# Patient Record
Sex: MALE | Race: AMERICAN INDIAN OR ALASKA NATIVE
[De-identification: names, ages, dates, MRNs, and addresses within clinical notes are randomized per-mention and may not be internally consistent; named-entity substitution may affect disease eponyms.]

---

## 2021-09-27 ENCOUNTER — HOSPITAL ENCOUNTER (INPATIENT)
Dept: HOSPITAL 5 - ED | Age: 56
LOS: 8 days | Discharge: HOME | DRG: 853 | End: 2021-10-05
Attending: INTERNAL MEDICINE | Admitting: INTERNAL MEDICINE
Payer: COMMERCIAL

## 2021-09-27 DIAGNOSIS — M86.171: ICD-10-CM

## 2021-09-27 DIAGNOSIS — Z79.899: ICD-10-CM

## 2021-09-27 DIAGNOSIS — A41.9: Primary | ICD-10-CM

## 2021-09-27 DIAGNOSIS — Z83.3: ICD-10-CM

## 2021-09-27 DIAGNOSIS — Z82.49: ICD-10-CM

## 2021-09-27 DIAGNOSIS — E11.52: ICD-10-CM

## 2021-09-27 DIAGNOSIS — N17.0: ICD-10-CM

## 2021-09-27 DIAGNOSIS — A48.0: ICD-10-CM

## 2021-09-27 DIAGNOSIS — L03.115: ICD-10-CM

## 2021-09-27 DIAGNOSIS — E11.69: ICD-10-CM

## 2021-09-27 DIAGNOSIS — E11.65: ICD-10-CM

## 2021-09-27 DIAGNOSIS — I10: ICD-10-CM

## 2021-09-27 LAB
ALBUMIN SERPL-MCNC: 4 G/DL (ref 3.9–5)
ALT SERPL-CCNC: 11 UNITS/L (ref 7–56)
BASOPHILS # (AUTO): 0.2 K/MM3 (ref 0–0.1)
BASOPHILS NFR BLD AUTO: 1.1 % (ref 0–1.8)
BILIRUB DIRECT SERPL-MCNC: < 0.2 MG/DL (ref 0–0.2)
EOSINOPHIL # BLD AUTO: 0 K/MM3 (ref 0–0.4)
EOSINOPHIL NFR BLD AUTO: 0.2 % (ref 0–4.3)
HCT VFR BLD CALC: 34.3 % (ref 35.5–45.6)
HGB BLD-MCNC: 10.9 GM/DL (ref 11.8–15.2)
LYMPHOCYTES # BLD AUTO: 2 K/MM3 (ref 1.2–5.4)
LYMPHOCYTES NFR BLD AUTO: 12.9 % (ref 13.4–35)
MCHC RBC AUTO-ENTMCNC: 32 % (ref 32–34)
MCV RBC AUTO: 78 FL (ref 84–94)
MONOCYTES # (AUTO): 1.2 K/MM3 (ref 0–0.8)
MONOCYTES % (AUTO): 7.8 % (ref 0–7.3)
PLATELET # BLD: 492 K/MM3 (ref 140–440)
RBC # BLD AUTO: 4.4 M/MM3 (ref 3.65–5.03)

## 2021-09-27 PROCEDURE — C2623 CATH, TRANSLUMIN, DRUG-COAT: HCPCS

## 2021-09-27 PROCEDURE — C1760 CLOSURE DEV, VASC: HCPCS

## 2021-09-27 PROCEDURE — 88311 DECALCIFY TISSUE: CPT

## 2021-09-27 PROCEDURE — C1714 CATH, TRANS ATHERECTOMY, DIR: HCPCS

## 2021-09-27 PROCEDURE — 80076 HEPATIC FUNCTION PANEL: CPT

## 2021-09-27 PROCEDURE — 37228: CPT

## 2021-09-27 PROCEDURE — 37225: CPT

## 2021-09-27 PROCEDURE — 75710 ARTERY X-RAYS ARM/LEG: CPT

## 2021-09-27 PROCEDURE — C1769 GUIDE WIRE: HCPCS

## 2021-09-27 PROCEDURE — 82962 GLUCOSE BLOOD TEST: CPT

## 2021-09-27 PROCEDURE — 76937 US GUIDE VASCULAR ACCESS: CPT

## 2021-09-27 PROCEDURE — C1887 CATHETER, GUIDING: HCPCS

## 2021-09-27 PROCEDURE — 75625 CONTRAST EXAM ABDOMINL AORTA: CPT

## 2021-09-27 PROCEDURE — 36415 COLL VENOUS BLD VENIPUNCTURE: CPT

## 2021-09-27 PROCEDURE — 80048 BASIC METABOLIC PNL TOTAL CA: CPT

## 2021-09-27 PROCEDURE — C1884 EMBOLIZATION PROTECT SYST: HCPCS

## 2021-09-27 PROCEDURE — 85027 COMPLETE CBC AUTOMATED: CPT

## 2021-09-27 PROCEDURE — 88305 TISSUE EXAM BY PATHOLOGIST: CPT

## 2021-09-27 PROCEDURE — 93925 LOWER EXTREMITY STUDY: CPT

## 2021-09-27 PROCEDURE — 83036 HEMOGLOBIN GLYCOSYLATED A1C: CPT

## 2021-09-27 PROCEDURE — 85025 COMPLETE CBC W/AUTO DIFF WBC: CPT

## 2021-09-27 PROCEDURE — 87040 BLOOD CULTURE FOR BACTERIA: CPT

## 2021-09-27 PROCEDURE — 82140 ASSAY OF AMMONIA: CPT

## 2021-09-27 PROCEDURE — 80202 ASSAY OF VANCOMYCIN: CPT

## 2021-09-27 NOTE — EVENT NOTE
Date of service: 09/27/21


Face to Face: 


For this encounter I have reviewed the PA/NP documentation, treatment plan, 

medical decision making, and I had face to face time with this patient. 





 patient presented secondary to right foot and toe pain.  He had a blister to 

the lateral aspect of the right foot several weeks ago.  It had popped.  He 

started to have problems.  He went to a podiatrist several weeks ago and was 

told that it was not infected.  He is supposed to go the by just tomorrow for 

recheck.  His little toe started to become more dark and dusky.  His wife was 

concerned.  She referred to me for evaluation.  There is no known trauma.





On exam, patient has a dusky right little toe.  This is consistent with a 

gangrene presentation.  There is a large ulcer to the lateral aspect of the 

right foot distally.  There is edema and erythema and warmth to the dorsum of 

the right foot.  This is consistent with a cellulitis.  Patient will require 

admission for IV antibiotics and orthopedic consultation.  He is aware that he 

may lose the toe.  He is in fact diabetic.

## 2021-09-27 NOTE — XRAY REPORT
RIGHT FOOT 3 VIEWS 2255



INDICATION: foot cellulitis with open wound



COMPARISON: 3/6/2015



FINDINGS: Old second metatarsal fracture is again noted. No acute fractures or dislocations are seen.
 Tarsal and ankle degenerative changes are noted.



Wound along the ventral lateral aspect of the distal foot is seen in the area of the fifth metatarsop
halangeal joint. No foreign bodies are noted. Gas is seen in the soft tissues in this area. The base 
of the proximal phalanx of the fifth digit as well as the head of the metatarsal show areas of decrea
sed density and decreased cortical definition of concern for developing osteomyelitis.



MR is suggested.







Signer Name: Ariel Monsalve MD 

Signed: 9/27/2021 11:44 PM

Workstation Name: [id number]

## 2021-09-27 NOTE — EMERGENCY DEPARTMENT REPORT
ED Lower Extremity HPI





- General


Chief Complaint: Extremity Injury, Lower


Stated Complaint: DIABETIC FOOT SORE


Time Seen by Provider: 21 22:39


Source: patient


Mode of arrival: Ambulatory


Limitations: No Limitations





- History of Present Illness


Initial Comments: 





This is a 56-year-old male nontoxic, well nourished in appearance, no acute 

signs of distress presents to the ED with c/o of right lateral foot open wound 

and cyanotic right fifth toe times several days.  Patient stated had a small 

blister which turned into an open wound and was treated with a podiatrist but 

symptoms worsen.  Patient was on outpatient antibiotics and was brought by PCP 

for further evaluation and treatment.  Denies any trauma or injuries.  Patient 

denies any numbness, tingling, fever, chills, nausea, vomiting, chest pain, 

shortness of breath, headache, stiff neck.  Patient stated has decreased gait 

due to pain.  Patient denies any allergies. PMH include DM.


MD Complaint: foot injury


-: days(s)


Injury: Foot: Right, Toes: Right


Severity: mild


Severity scale (0 -10): 8


Improves With: immobilization


Worsens With: weight bearing, movement, palpation


Associated Symptoms: swelling, able to partially bear weight.  denies: snap/pop 

sensation, numbness, tingling, unable to bear weight





- Related Data


                                Home Medications











 Medication  Instructions  Recorded  Confirmed  Last Taken


 


Amlodipine Besylate [Norvasc] 10 mg PO DAILY 03/06/15 03/11/15 03/05/15


 


Gabapentin 100 mg PO DAILY 03/06/15 03/11/15 03/05/15


 


Losartan/Hydrochlorothiazide 100 - 125 each PO DAILY 03/06/15 03/11/15 03/05/15





[Hyzaar 100-25 TAB]    


 


Metformin HCl [Fortamet ER] 1,000 mg PO QDAY 03/06/15 03/11/15 03/05/15


 


Simvastatin 20 mg PO QHS 03/06/15 03/11/15 03/05/15


 


glipiZIDE [Glucotrol] 10 mg PO QDAY 03/06/15 03/11/15 03/05/15








                                  Previous Rx's











 Medication  Instructions  Recorded  Last Taken  Type


 


Amoxicillin/K Clav Tab [Augmentin 1 each PO Q12H #10 day 03/09/15 Unknown Rx





875MG TAB]    











                                    Allergies











Allergy/AdvReac Type Severity Reaction Status Date / Time


 


No Known Allergies Allergy   Verified 21 22:32














ED Review of Systems


ROS: 


Stated complaint: DIABETIC FOOT SORE


Other details as noted in HPI





Comment: All other systems reviewed and negative


Constitutional: denies: chills, fever


Eyes: denies: eye pain, eye discharge, vision change


ENT: denies: ear pain, throat pain


Respiratory: denies: cough, shortness of breath, wheezing


Cardiovascular: denies: chest pain, palpitations


Endocrine: no symptoms reported


Gastrointestinal: denies: abdominal pain, nausea, diarrhea


Genitourinary: denies: urgency, dysuria


Musculoskeletal: denies: back pain, joint swelling, arthralgia


Skin: denies: rash, lesions


Neurological: denies: headache, weakness, paresthesias


Psychiatric: denies: anxiety, depression


Hematological/Lymphatic: denies: easy bleeding, easy bruising





ED Past Medical Hx





- Past Medical History


Hx Hypertension: Yes


Hx Diabetes: Yes (21 years ago)


Hx COPD: No





- Social History


Smoking Status: Never Smoker





- Medications


Home Medications: 


                                Home Medications











 Medication  Instructions  Recorded  Confirmed  Last Taken  Type


 


Amlodipine Besylate [Norvasc] 10 mg PO DAILY 03/06/15 03/11/15 03/05/15 History


 


Gabapentin 100 mg PO DAILY 03/06/15 03/11/15 03/05/15 History


 


Losartan/Hydrochlorothiazide 100 - 125 each PO DAILY 03/06/15 03/11/15 03/05/15 

History





[Hyzaar 100-25 TAB]     


 


Metformin HCl [Fortamet ER] 1,000 mg PO QDAY 03/06/15 03/11/15 03/05/15 History


 


Simvastatin 20 mg PO QHS 03/06/15 03/11/15 03/05/15 History


 


glipiZIDE [Glucotrol] 10 mg PO QDAY 03/06/15 03/11/15 03/05/15 History


 


Amoxicillin/K Clav Tab [Augmentin 1 each PO Q12H #10 day 03/09/15 03/11/15 

Unknown Rx





875MG TAB]     














ED Physical Exam





- General


Limitations: No Limitations


General appearance: alert, in no apparent distress





- Head


Head exam: Present: atraumatic, normocephalic





- Eye


Eye exam: Present: normal appearance





- Neck


Neck exam: Present: normal inspection.  Absent: lymphadenopathy





- Respiratory


Respiratory exam: Absent: respiratory distress





- Cardiovascular


Cardiovascular Exam: Present: tachycardia





- Extremities Exam


Extremities exam: Present: full ROM, tenderness, normal capillary refill, pedal 

edema.  Absent: joint swelling, calf tenderness





- Expanded Lower Extremity Exam


  ** Right


Hip exam: Present: normal inspection, full ROM.  Absent: tenderness, swelling


Upper Leg exam: Present: normal inspection, full ROM.  Absent: tenderness, 

swelling


Knee exam: Present: normal inspection, full ROM.  Absent: tenderness, swelling


Lower Leg exam: Present: normal inspection, full ROM.  Absent: tenderness, 

swelling


Ankle exam: Present: normal inspection, full ROM.  Absent: tenderness, swelling,

abrasion, laceration, ecchymosis, deformity, crepidus, dislocation, erythema, 

anterior draw sign


Foot/Toe exam: Present: normal inspection, full ROM, tenderness, swelling, 

ecchymosis, erythema.  Absent: abrasion, laceration, deformity, crepidus, 

dislocation, amputation, foreign body, calcaneal tenderness, tenderness at base 

of 5th metatarsal, nail avulsion, subungual hematoma


Gait: Positive: observed and limited by pain





                            __________________________














                            __________________________





 1 - Exam consistent with necrosis








                            __________________________














                            __________________________





 1 - Cellulitis with some swelling








- Back Exam


Back exam: Present: normal inspection, full ROM





- Neurological Exam


Neurological exam: Present: alert, oriented X3





- Psychiatric


Psychiatric exam: Present: normal affect, normal mood





- Skin


Skin exam: Present: warm, dry, intact, normal color.  Absent: rash





ED Course


                                   Vital Signs











  21





  22:37


 


Temperature 99.1 F


 


Pulse Rate 108 H


 


Respiratory 18





Rate 


 


Blood Pressure 151/88





[Left] 


 


O2 Sat by Pulse 97





Oximetry 














- Reevaluation(s)


Reevaluation #1: 





21 23:39


Patient is speaking in full sentences with no signs of distress noted.





- Consultations


Consultation #1: 





21 23:39


Patient has been consulted with Dr. Pergrem about patient history, physical 

exam, and examined patient and agrees to ED plan of care and admission.


Consultation #2: 





21 00:17


Patient has been consulted with REINA Isidro (hospitalist) about patient 

history, physical exam, and labs/imaging results and accepts patient to 

services.











ED Lower Extremity MDM





- Lab Data


Result diagrams: 


                                 21 23:17





                                 21 23:17





- Radiology Data





[facility] [address] [place] [address] 

XRay Report Signed Patient: MANNIE MCCARTNEY MR#: [id number] : 1965 

Acct:[id number] Age/Sex: 56 / M ADM Date: 21 Loc: ED Attending Dr: 

Ordering Physician: JOSE ELIAS SUAZO NP Date of Service: 21 Procedure(s): XR

 foot 3+V RT Accession Number(s): [id number] cc: JOSE ELIAS SUAZO NP Fluoro Time In 

Minutes: RIGHT FOOT 3 VIEWS 2255 INDICATION: foot cellulitis with open wound 

COMPARISON: 3/6/2015 FINDINGS: Old second metatarsal fracture is again noted. No

 acute fractures or dislocations are seen. Tarsal and ankle degenerative changes

 are noted. Wound along the ventral lateral aspect of the distal foot is seen in

 the area of the fifth metatarsophalangeal joint. No foreign bodies are noted. 

Gas is seen in the soft tissues in this area. The base of the proximal phalanx 

of the fifth digit as well as the head of the metatarsal show areas of decreased

 density and decreased cortical definition of concern for developing 

osteomyelitis. MR is suggested. Signer Name: Ariel Monsalve MD Signed: 

2021 11:44 PM Workstation Name: [id number] Transcribed By: GJ Dictated 

By: Ariel Monsalve MD Electronically Authenticated By: Ariel Monsalve MD Signed Date/Time: 21 DD/DT: 21 TD/TT:





- Medical Decision Making





56-year-old male that presents with necrosis and most likely osteomyelitis.  

Patient is stable and was examined by me.  Patient admitted with hospitalist.  

Patient is notified of the lab results and x-ray imaging with no questions noted

 by the patient.  Patient received Zosyn IV in ER.  Vital signs are stable on 

admission.  At time of admission, the patient does not seem toxic or ill in ap

pearance.  No acute signs of distress noted.  Patient agrees to admission 

treatment plan of care.  No further questions noted by the patient.


Critical care attestation.: 


If time is entered above; I have spent that time in minutes in the direct care 

of this critically ill patient, excluding procedure time.








ED Disposition


Clinical Impression: 


 Wound infection, Toe necrosis, Cellulitis of foot, right





Foot osteomyelitis, right


Qualifiers:


 Osteomyelitis type: unspecified type Qualified Code(s): M86.9 - Osteomyelitis, 

unspecified





Wound, open, foot


Qualifiers:


 Encounter type: initial encounter Laterality: right Qualified Code(s): S91.301A

 - Unspecified open wound, right foot, initial encounter





Disposition:  ADMITTED AS INPATIENT


Is pt being admited?: Yes


Condition: Stable


Time of Disposition: 00:17

## 2021-09-28 LAB
BUN SERPL-MCNC: 40 MG/DL (ref 9–20)
BUN/CREAT SERPL: 22 %
CALCIUM SERPL-MCNC: 10 MG/DL (ref 8.4–10.2)
HEMOLYSIS INDEX: 0

## 2021-09-28 RX ADMIN — INSULIN LISPRO SCH UNIT: 100 INJECTION, SOLUTION INTRAVENOUS; SUBCUTANEOUS at 21:58

## 2021-09-28 RX ADMIN — FAMOTIDINE SCH MG: 10 INJECTION, SOLUTION INTRAVENOUS at 21:59

## 2021-09-28 RX ADMIN — DOCUSATE SODIUM SCH MG: 100 CAPSULE, LIQUID FILLED ORAL at 14:16

## 2021-09-28 RX ADMIN — Medication SCH ML: at 14:17

## 2021-09-28 RX ADMIN — HEPARIN SODIUM SCH UNIT: 5000 INJECTION, SOLUTION INTRAVENOUS; SUBCUTANEOUS at 21:58

## 2021-09-28 RX ADMIN — INSULIN GLARGINE SCH: 100 INJECTION, SOLUTION SUBCUTANEOUS at 13:00

## 2021-09-28 RX ADMIN — FAMOTIDINE SCH MG: 10 INJECTION, SOLUTION INTRAVENOUS at 14:17

## 2021-09-28 RX ADMIN — METRONIDAZOLE SCH MLS/HR: 5 INJECTION, SOLUTION INTRAVENOUS at 17:04

## 2021-09-28 RX ADMIN — GABAPENTIN SCH MG: 100 CAPSULE ORAL at 14:16

## 2021-09-28 RX ADMIN — Medication SCH ML: at 22:00

## 2021-09-28 RX ADMIN — INSULIN LISPRO SCH: 100 INJECTION, SOLUTION INTRAVENOUS; SUBCUTANEOUS at 12:58

## 2021-09-28 RX ADMIN — HEPARIN SODIUM SCH UNIT: 5000 INJECTION, SOLUTION INTRAVENOUS; SUBCUTANEOUS at 14:16

## 2021-09-28 RX ADMIN — DOCUSATE SODIUM SCH MG: 100 CAPSULE, LIQUID FILLED ORAL at 21:59

## 2021-09-28 RX ADMIN — METRONIDAZOLE SCH MLS/HR: 5 INJECTION, SOLUTION INTRAVENOUS at 21:58

## 2021-09-28 RX ADMIN — CEFTRIAXONE SODIUM SCH MLS/HR: 2 INJECTION, POWDER, FOR SOLUTION INTRAMUSCULAR; INTRAVENOUS at 15:22

## 2021-09-28 RX ADMIN — PRAVASTATIN SODIUM SCH MG: 40 TABLET ORAL at 21:59

## 2021-09-28 NOTE — HISTORY AND PHYSICAL REPORT
History of Present Illness


Date of examination: 09/28/21


Date of admission: 


09/28/21 00:25





Chief complaint: 





Right foot wound, right foot cellulitis


History of present illness: 





56-year-old -American male with history of uncontrolled diabetes, 

hypertension, and nonhealing diabetic foot ulcer who presents to [facility] ED with 

complaints of right foot wound.  Patient reports having a small blister on his 

right fifth toe that has worsened and developed into an open wound over the past

few weeks.  He was following with outpatient podiatry and was started on oral a

ntibiotics for treatment of wound.  However, the wound has not improved but 

rather worsened despite receiving treatment.  Patient states he called his 

podiatrist to advise of worsening wound, which now had increased red tinge 

drainage, and was told to report to the ED for further patient and treatment. 





Endorses being fully vaccinated for COVID-19.  He received the Pfizer vaccine in

April of this year.





Denies fever, chills, nausea, vomiting diarrhea, headache, shortness of breath, 

chest pain, palpitation, foul/malodorous drainage from wound, increased 

urination, polydipsia, polyphagia, noncompliance with meds, recent injury/trauma

to right foot, or recent sick contacts








Past History


Past Medical History: diabetes, hypertension


Past Surgical History: Other (Right knee surgery)


Social history: , lives with family, full code.  denies: smoking, alcohol

abuse, prescription drug abuse, IV drug use


Family history: CAD, diabetes, hypertension





Medications and Allergies


                                    Allergies











Allergy/AdvReac Type Severity Reaction Status Date / Time


 


No Known Allergies Allergy   Verified 09/27/21 22:32











                                Home Medications











 Medication  Instructions  Recorded  Confirmed  Last Taken  Type


 


Amlodipine Besylate [Norvasc] 10 mg PO DAILY 03/06/15 03/11/15 03/05/15 History


 


Gabapentin 100 mg PO DAILY 03/06/15 03/11/15 03/05/15 History


 


Losartan/Hydrochlorothiazide 100 - 125 each PO DAILY 03/06/15 03/11/15 03/05/15 

History





[Hyzaar 100-25 TAB]     


 


Metformin HCl [Fortamet ER] 1,000 mg PO QDAY 03/06/15 03/11/15 03/05/15 History


 


Simvastatin 20 mg PO QHS 03/06/15 03/11/15 03/05/15 History


 


glipiZIDE [Glucotrol] 10 mg PO QDAY 03/06/15 03/11/15 03/05/15 History


 


Amoxicillin/K Clav Tab [Augmentin 1 each PO Q12H #10 day 03/09/15 03/11/15 

Unknown Rx





875MG TAB]     











Active Meds: 


Active Medications





Acetaminophen (Acetaminophen 325 Mg Tab)  650 mg PO Q4H PRN


   PRN Reason: Pain MILD(1-3)/Fever >100.5/HA


Amlodipine Besylate (Amlodipine 10 Mg Tab)  10 mg PO DAILY [facility]


Dextrose (Dextrose 50% In Water (25gm) 50 Ml Syringe)  50 ml IV Q30MIN PRN; 

Protocol


   PRN Reason: Hypoglycemia


Docusate Sodium (Docusate Sodium 100 Mg Cap)  100 mg PO BID PAULY


Famotidine (Famotidine 20 Mg/2 Ml Inj)  10 mg IV BID PAULY


Gabapentin (Gabapentin 100 Mg Cap)  100 mg PO DAILY [facility]


Heparin Sodium (Porcine) (Heparin 5,000 Unit/1 Ml Vial)  5,000 unit SUB-Q Q12HR 

[facility]


Sodium Chloride (Nacl 0.45% 1000 Ml)  1,000 mls @ 75 mls/hr IV AS DIRECT PAULY


   Stop: 09/28/21 12:00


Piperacillin Sod/Tazobactam Sod (Zosyn/Ns 4.5gm/100ml)  4.5 gm in 100 mls @ 200 

mls/hr IV Q8H PAULY; Protocol


Insulin Human Lispro (Insulin Lispro 100 Unit/Ml)  0 unit SUB-Q ACHS PAULY; 

Protocol


Morphine Sulfate (Morphine 4 Mg/1 Ml Inj)  4 mg IV Q4H PRN


   PRN Reason: Pain , Severe (7-10)


Naloxone HCl (Naloxone 0.4 Mg/1 Ml Inj)  0.1 mg IV Q2MIN PRN


   PRN Reason: Res Rate </= 8 or 02 SAT < 92%


Ondansetron HCl (Ondansetron 4 Mg/2 Ml Inj)  4 mg IV Q6H PRN


   PRN Reason: Nausea And Vomiting


Oxycodone/Acetaminophen (Oxycodone /Acetaminophen 5-325mg Tab)  1 tab PO Q6H PRN


   PRN Reason: Pain, Moderate (4-6)


Pravastatin Sodium (Pravastatin 40 Mg Tab)  40 mg PO QHS [facility]


Sodium Chloride (Sodium Chloride 0.9% 10 Ml Flush Syringe)  10 ml IV BID PAULY


Sodium Chloride (Sodium Chloride 0.9% 10 Ml Flush Syringe)  10 ml IV PRN PRN


   PRN Reason: LINE FLUSH











Review of Systems


All systems: negative (as noted in HPI)





Exam





- Physical Exam


Narrative exam: 





Physical exam





General appearance: Present: No acute distress, alert and oriented 3,   

American adult man





- EENT


Eyes: Present: PERRL, EOM intact


ENT: hearing intact, normal dentition





- Neck


Neck: Present: supple, normal ROM





- Respiratory


Respiratory effort: Non-labored


Respiratory: Clear throughout





- Cardiovascular


Heart rate: 88 (bpm)


Rhythm: Sinus 


Heart Sounds: Present: S1 & S2.  Absent: rub, click





- Extremities


Extremities: pulses intact, right ankle edema with slight erythema, right fourth

and fifth toe wound with moderate blood tinge drainage, and yellowish wound bed,

right fourth and fifth toe with dark discoloration (likely due to poor 

perfusion)





- Peripheral Assessment


Peripheral Pulses: within normal limits


 


- Abdominal


General gastrointestinal: soft, non-tender, normal bowel sounds





- Integumentary


Integumentary: Present: warm, dry





- Musculoskeletal


Musculoskeletal: Able to move all extremities, limited ROM to right foot due to 

wound





-Neurological


Neurological: CN II-XII intact





- Psychiatric


Psychiatric: cooperative





- Constitutional


Vitals: 


                                        











Temp Pulse Resp BP Pulse Ox


 


 99.1 F   108 H  18   151/88   97 


 


 09/27/21 22:37  09/27/21 22:37  09/27/21 22:37  09/27/21 22:37  09/27/21 22:37














Results





- Labs


CBC & Chem 7: 


                                 09/27/21 23:17





                                 09/27/21 23:17


Labs: 


                             Laboratory Last Values











WBC  15.4 K/mm3 (4.5-11.0)  H  09/27/21  23:17    


 


RBC  4.40 M/mm3 (3.65-5.03)   09/27/21  23:17    


 


Hgb  10.9 gm/dl (11.8-15.2)  L  09/27/21  23:17    


 


Hct  34.3 % (35.5-45.6)  L  09/27/21  23:17    


 


MCV  78 fl (84-94)  L  09/27/21  23:17    


 


MCH  25 pg (28-32)  L  09/27/21  23:17    


 


MCHC  32 % (32-34)   09/27/21  23:17    


 


RDW  14.3 % (13.2-15.2)   09/27/21  23:17    


 


Plt Count  492 K/mm3 (140-440)  H  09/27/21  23:17    


 


Lymph % (Auto)  12.9 % (13.4-35.0)  L  09/27/21  23:17    


 


Mono % (Auto)  7.8 % (0.0-7.3)  H  09/27/21  23:17    


 


Eos % (Auto)  0.2 % (0.0-4.3)   09/27/21  23:17    


 


Baso % (Auto)  1.1 % (0.0-1.8)   09/27/21  23:17    


 


Lymph # (Auto)  2.0 K/mm3 (1.2-5.4)   09/27/21  23:17    


 


Mono # (Auto)  1.2 K/mm3 (0.0-0.8)  H  09/27/21  23:17    


 


Eos # (Auto)  0.0 K/mm3 (0.0-0.4)   09/27/21  23:17    


 


Baso # (Auto)  0.2 K/mm3 (0.0-0.1)  H  09/27/21  23:17    


 


Seg Neutrophils %  78.0 % (40.0-70.0)  H  09/27/21  23:17    


 


Seg Neutrophils #  12.0 K/mm3 (1.8-7.7)  H  09/27/21  23:17    


 


Sodium  134 mmol/L (137-145)  L  09/27/21  23:17    


 


Potassium  4.2 mmol/L (3.6-5.0)   09/27/21  23:17    


 


Chloride  93.1 mmol/L ()  L  09/27/21  23:17    


 


Carbon Dioxide  29 mmol/L (22-30)   09/27/21  23:17    


 


Anion Gap  16 mmol/L  09/27/21  23:17    


 


BUN  40 mg/dL (9-20)  H  09/27/21  23:17    


 


Creatinine  1.8 mg/dL (0.8-1.3)  H  09/27/21  23:17    


 


Estimated GFR  47 ml/min  09/27/21  23:17    


 


BUN/Creatinine Ratio  22 %  09/27/21  23:17    


 


Glucose  178 mg/dL ()  H  09/27/21  23:17    


 


Hemoglobin A1c  11.8 % (4-6)  H  09/27/21  23:17    


 


Lactic Acid  1.80 mmol/L (0.7-2.0)   09/27/21  23:17    


 


Calcium  10.0 mg/dL (8.4-10.2)   09/27/21  23:17    


 


Total Bilirubin  0.40 mg/dL (0.1-1.2)   09/27/21  23:17    


 


Direct Bilirubin  < 0.2 mg/dL (0-0.2)   09/27/21  23:17    


 


Indirect Bilirubin  0.2 mg/dL  09/27/21  23:17    


 


AST  11 units/L (5-40)   09/27/21  23:17    


 


ALT  11 units/L (7-56)   09/27/21  23:17    


 


Alkaline Phosphatase  107 units/L ()   09/27/21  23:17    


 


Total Protein  8.6 g/dL (6.3-8.2)  H  09/27/21  23:17    


 


Albumin  4.0 g/dL (3.9-5)   09/27/21  23:17    


 


Albumin/Globulin Ratio  0.9 %  09/27/21  23:17    











Microbiology: 


Microbiology





09/27/21 23:17   Peripheral/Venous   Blood Culture - Preliminary


                            Culture in Progress


09/27/21 23:13   Peripheral/Venous   Blood Culture - Preliminary


                            Culture in Progress











- Imaging and Cardiology


Imaging and Cardiology: 





Foot XR:





FINDINGS: Old second metatarsal fracture is again noted. No acute fractures or 

dislocations are seen. Tarsal and ankle degenerative changes are noted. Wound 

along the ventral lateral aspect of the distal foot is seen in the area of the 

fifth metatarsophalangeal joint. No foreign bodies are noted. Gas is seen in the

soft tissues in this area. The base of the proximal phalanx of the fifth digit 

as well as the head of the metatarsal show areas of decreased density and decre

ased cortical definition of concern for developing osteomyelitis. 





Assessment and Plan


Assessment and plan: 














Right foot cellulitis


-On IV ABX


-Cultures pending


-Supportive care








Right foot gas gangrene


-Foot x-ray reveals Gas is seen in the soft tissues of the fifth metacarpal 

joint


-Cultures pending


-On IV ABX


--ID and Ortho consult pending


-Wound care education pending


-Wound care consult pending





Suspicion for osteomyelitis


-Foot x-ray shows suspicion for osteomyelitis


-MRI pending


-Cultures pending


-On IV ABX


-ID and Ortho consult pending


-May resume outpatient follow-up with podiatry as needed once discharge





GERARD


-Cr on admission 1.8


-Hydrate with IVF


-Avoid nephrotoxic agents


-Renal dose all meds


-Monitor renal function





DM2


-Uncontrolled


-HgbA1c 11.8


-POC BG monitoring


-Schedule Lantus and SSI coverage prn


-May benefit from diabetic education





HTN


-Monitor BP


-Resume home hypertensive meds, to optimize BP








DVT and GI PPX


-On heparin and Pepcid





Advance Directives: No


VTE prophylaxis?: Chemical, Mechanical


Plan of care discussed with patient/family: Yes

## 2021-09-28 NOTE — VASCULAR LAB REPORT
DUPLEX DOPPLER LOWER EXTREMITY ARTERIAL, BILATERAL



INDICATION / CLINICAL INFORMATION: peripheral vascular disease, wounds.



TECHNIQUE: Arterial duplex examination of both lower extremities performed using B-mode, color flow a
nd spectral Doppler assessment.



COMPARISON: None available.



FINDINGS: 



RIGHT:

Common Femoral Artery: PSV 88 cm/sec.  Triphasic waveform.

Proximal SFA: PSV 68 cm/sec.  Triphasic waveform.

Mid SFA:  cm/sec.  Monophasic waveform.

Distal SFA: PSV 65 cm/sec.  Monophasic waveform.

Popliteal artery:  cm/sec.  Monophasic waveform.

Posterior tibial artery:  cm/sec.  Monophasic waveform.

Dorsalis Pedis Artery: PSV 48 cm/sec.  Monophasic waveform.



LEFT:

Common Femoral Artery: PSV 90 cm/sec.  Triphasic waveform.

Proximal SFA: PSV 57 cm/sec.  Triphasic waveform.

Mid SFA:  cm/sec.  Triphasic waveform.

Distal SFA: PSV 82 cm/sec.  Biphasic waveform.

Popliteal artery: PSV 34 cm/sec.  Triphasic waveform.

Posterior tibial artery:  cm/sec.  Triphasic waveform.

Dorsalis Pedis Artery: PSV 12 cm/sec.  Monophasic waveform.



Right KRISTIN: Not calculated.

Left KRISTIN: Not calculated.



IMPRESSION:

1. Multifocal atherosclerotic disease is present within both lower extremities. Abnormal waveforms ar
e noted bilaterally, right greater than left.

2. There is significant stenosis of the right mid superficial femoral artery and right popliteal andres
ry. There is also stenosis noted within the left posterior tibial artery. 

3. There is suggestion occlusion of the right distal anterior tibial artery and peroneal artery.



Conventional or CT angiography should be considered to better characterize these findings.



======================

Ankle-Brachial Index (KRISTIN): 

======================

- Calcified arteries > 1.4 

- Normal = 0.9-1.4 

- Mild PAD = 0.7-0.89

- Moderate PAD = 0.51-0.69 

- Severe PAD < 0.5



======================

Doppler Waveform: 

======================

- Triphasic is normal. 

- Biphasic is abnormal if clear transition from triphasic signal along vascular tree.

- Monophasic is abnormal.



Scribed by: Vane Dixon RDMS, RVT 

Scribed: 9/28/2021 4:24 PM 



 I have reviewed the images, agree with this report, and edited this report as needed.



Signer Name: Janes Hinson MD 

Signed: 9/28/2021 5:30 PM

Workstation Name: [id number]

## 2021-09-28 NOTE — MAGNETIC RESONANCE REPORT
MRI RIGHT FOOT WITHOUT CONTRAST



INDICATION / CLINICAL INFORMATION: Foot swelling. Osteomyelitis.



TECHNIQUE: Multiplanar, multisequence MR images were obtained.



COMPARISON: Radiographs dated 9/27/2021



FINDINGS:



BONES: There is marrow edema in the fifth metatarsal with early resorption of the fifth metatarsal he
ad and neck. No acute fracture seen. There is also marrow edema in the proximal phalanx of the fifth 
toe. There is a chronic healed fracture of second metatarsal.

JOINTS: Moderate DJD of the great toe MTP and interphalangeal joint. No significant joint effusion or
 synovitis. 



MUSCLES: Nonspecific edema in the flexor muscles of the foot.



FLEXOR TENDONS: No significant abnormality.

EXTENSOR TENDONS: No significant abnormality.

PERONEAL TENDONS: No significant abnormality.

LIGAMENTS: No significant abnormality.



SOFT TISSUES: There is a large soft tissue wound at the lateral foot adjacent to fifth metatarsal phuong
suring proximally 4 cm in length. There is adjacent gas in the subcutaneous tissues.



ADDITIONAL FINDINGS: None.



IMPRESSION:

1. Osteomyelitis of fifth metatarsal and proximal phalanx of fifth toe. Adjacent large soft tissue wo
und with gas in the soft tissues. No soft tissue abscess.

2. Chronic healed fracture deformity of second metatarsal.

3. Degenerative changes as above.





==================================================================

Report dictated by: Franklyn Camara MD 

Report dictated on: 9/28/2021 9:14 AM



I have reviewed the images, agree with this report, and edited this report as needed.

 



Signer Name: JING Maciel MD 

Signed: 9/28/2021 12:29 PM

Workstation Name: [id number]

## 2021-09-28 NOTE — CONSULTATION
History of Present Illness





- Reason for Consult


Consult date: 09/28/21


osteomyelitis


Requesting physician: JERRY MANRIQUE





- History of Present Illness





The patient is a 56-year-old male with diabetes, hypertension, worsening 

diabetic foot ulceration and wound over the last 2 weeks came to the emergency 

room for additional evaluation.  He reports she was following up with outpatient

podiatry, was on oral antibiotics.  With increasing drainage he was advised to 

come to the ER.  Here, noted to have leukocytosis, low-grade fever.  Creatinine 

1.8.  MRI of the right foot was done which revealed osteomyelitis of the fifth 

metatarsal and proximal phalanx of the fifth toe along with an adjacent large 

soft tissue wound with soft tissue gas.  No abscess.





Review of Systems: 


General: no fevers,chills or rigors


HEENT: no new visual disturbance


Respiratory: No cough, sputum, hemoptysis or shortness of breath


Cardiovascular: No chest pain, syncope


Gastrointestinal: No nausea, vomiting or diarrhea


Genitourinary: No dysuria or hematuria


Musculoskeletal: No new or worsening neck pain or back pain 


Neurologic: No headaches, seizures


Hematologic: No easy bruising or bleeding


Endocrine: No night sweats or acute weight loss


Skin: negative for rash, jaundice


Psychiatric: No suicidal or homicidal ideation





Past History


Past Medical History: diabetes, hypertension


Past Surgical History: Other (Right knee surgery)


Social history: , lives with family, full code.  denies: smoking, alcohol

abuse, prescription drug abuse, IV drug use


Family history: CAD, diabetes, hypertension





Medications and Allergies


                                    Allergies











Allergy/AdvReac Type Severity Reaction Status Date / Time


 


No Known Allergies Allergy   Verified 09/27/21 22:32











                                Home Medications











 Medication  Instructions  Recorded  Confirmed  Last Taken  Type


 


Amlodipine Besylate [Norvasc] 10 mg PO DAILY 03/06/15 03/11/15 03/05/15 History


 


Gabapentin 100 mg PO DAILY 03/06/15 03/11/15 03/05/15 History


 


Losartan/Hydrochlorothiazide 100 - 125 each PO DAILY 03/06/15 03/11/15 03/05/15 

History





[Hyzaar 100-25 TAB]     


 


Metformin HCl [Fortamet ER] 1,000 mg PO QDAY 03/06/15 03/11/15 03/05/15 History


 


Simvastatin 20 mg PO QHS 03/06/15 03/11/15 03/05/15 History


 


glipiZIDE [Glucotrol] 10 mg PO QDAY 03/06/15 03/11/15 03/05/15 History


 


Amoxicillin/K Clav Tab [Augmentin 1 each PO Q12H #10 day 03/09/15 03/11/15 

Unknown Rx





875MG TAB]     











Active Meds: 


Active Medications





Acetaminophen (Acetaminophen 325 Mg Tab)  650 mg PO Q4H PRN


   PRN Reason: Pain MILD(1-3)/Fever >100.5/HA


Amlodipine Besylate (Amlodipine 10 Mg Tab)  10 mg PO DAILY [facility]


Dextrose (Dextrose 50% In Water (25gm) 50 Ml Syringe)  50 ml IV Q30MIN PRN; 

Protocol


   PRN Reason: Hypoglycemia


Docusate Sodium (Docusate Sodium 100 Mg Cap)  100 mg PO BID [facility]


Famotidine (Famotidine 20 Mg/2 Ml Inj)  10 mg IV BID [facility]


Gabapentin (Gabapentin 100 Mg Cap)  100 mg PO DAILY [facility]


Heparin Sodium (Porcine) (Heparin 5,000 Unit/1 Ml Vial)  5,000 unit SUB-Q Q12HR 

[facility]


Vancomycin HCl 1,500 mg/ (Sodium Chloride)  530 mls @ 333.333 mls/hr IV Q24H [facility]


Ceftriaxone Sodium (Rocephin/Ns 2 Gm/100 Ml)  2 gm in 100 mls @ 200 mls/hr IV 

Q24HR [facility]; Protocol


Metronidazole (Flagyl 500 Mg/100 Ml)  500 mg in 100 mls @ 100 mls/hr IV Q8H [facility];

Protocol


Insulin Glargine (Insulin Glargine 100 Units/Ml)  15 units SUB-Q QAMDIAB [facility]


   Last Admin: 09/28/21 13:00 Dose:  Not Given


   Documented by: 


Insulin Human Lispro (Insulin Lispro 100 Unit/Ml)  0 unit SUB-Q ACHS [facility]; 

Protocol


   Last Admin: 09/28/21 12:58 Dose:  Not Given


   Documented by: 


Morphine Sulfate (Morphine 4 Mg/1 Ml Inj)  4 mg IV Q4H PRN


   PRN Reason: Pain , Severe (7-10)


Naloxone HCl (Naloxone 0.4 Mg/1 Ml Inj)  0.1 mg IV Q2MIN PRN


   PRN Reason: Res Rate </= 8 or 02 SAT < 92%


Ondansetron HCl (Ondansetron 4 Mg/2 Ml Inj)  4 mg IV Q6H PRN


   PRN Reason: Nausea And Vomiting


Oxycodone/Acetaminophen (Oxycodone /Acetaminophen 5-325mg Tab)  1 tab PO Q6H PRN


   PRN Reason: Pain, Moderate (4-6)


Pravastatin Sodium (Pravastatin 40 Mg Tab)  40 mg PO QHS [facility]


Sodium Chloride (Sodium Chloride 0.9% 10 Ml Flush Syringe)  10 ml IV BID [facility]


Sodium Chloride (Sodium Chloride 0.9% 10 Ml Flush Syringe)  10 ml IV PRN PRN


   PRN Reason: LINE FLUSH











Physical Examination





- Physical Exam


Narrative exam: 





Physical Exam: 


Constitutional: Alert, cooperative. No acute distress


Head, Ears, Nose: Normocephalic, atraumatic. External ears, nose normal


Eyes: Conjunctivae/corneas clear. No icterus. No ptosis.


Neck: Supple, no meningeal signs


Cardiovascular: S1, S2 +


Respiratory: Good air entry, clear to auscultation bilaterally


GI: Soft, non-tender; bowel sounds normal. No peritoneal signs


Musculoskeletal: Right foot in dressing.


Skin: No rash or abscess


Hem/Lymphatic: No palpable cervical or supraclavicular nodes. No lymphangitis


Psych: Mood ok. Affect normal


Neurological: Awake, alert, oriented. No gross abnormality





- Constitutional


Vitals: 


                                   Vital Signs











Temp Pulse Resp BP Pulse Ox


 


 99.1 F   93 H  18   144/89   97 


 


 09/27/21 22:37  09/28/21 13:46  09/28/21 13:46  09/28/21 13:46  09/28/21 13:46








                           Temperature -Last 24 Hours











Temperature                    99.1 F

















Results





- Labs


CBC & Chem 7: 


                                 09/27/21 23:17





                                 09/27/21 23:17


Labs: 


                              Abnormal lab results











  09/27/21 09/27/21 09/27/21 Range/Units





  23:17 23:17 23:17 


 


WBC  15.4 H    (4.5-11.0)  K/mm3


 


Hgb  10.9 L    (11.8-15.2)  gm/dl


 


Hct  34.3 L    (35.5-45.6)  %


 


MCV  78 L    (84-94)  fl


 


MCH  25 L    (28-32)  pg


 


Plt Count  492 H    (140-440)  K/mm3


 


Lymph % (Auto)  12.9 L    (13.4-35.0)  %


 


Mono % (Auto)  7.8 H    (0.0-7.3)  %


 


Mono # (Auto)  1.2 H    (0.0-0.8)  K/mm3


 


Baso # (Auto)  0.2 H    (0.0-0.1)  K/mm3


 


Seg Neutrophils %  78.0 H    (40.0-70.0)  %


 


Seg Neutrophils #  12.0 H    (1.8-7.7)  K/mm3


 


Sodium   134 L   (137-145)  mmol/L


 


Chloride   93.1 L   ()  mmol/L


 


BUN   40 H   (9-20)  mg/dL


 


Creatinine   1.8 H   (0.8-1.3)  mg/dL


 


Glucose   178 H   ()  mg/dL


 


POC Glucose     ()  mg/dL


 


Hemoglobin A1c    11.8 H  (4-6)  %


 


Total Protein   8.6 H   (6.3-8.2)  g/dL














  09/28/21 Range/Units





  06:35 


 


WBC   (4.5-11.0)  K/mm3


 


Hgb   (11.8-15.2)  gm/dl


 


Hct   (35.5-45.6)  %


 


MCV   (84-94)  fl


 


MCH   (28-32)  pg


 


Plt Count   (140-440)  K/mm3


 


Lymph % (Auto)   (13.4-35.0)  %


 


Mono % (Auto)   (0.0-7.3)  %


 


Mono # (Auto)   (0.0-0.8)  K/mm3


 


Baso # (Auto)   (0.0-0.1)  K/mm3


 


Seg Neutrophils %   (40.0-70.0)  %


 


Seg Neutrophils #   (1.8-7.7)  K/mm3


 


Sodium   (137-145)  mmol/L


 


Chloride   ()  mmol/L


 


BUN   (9-20)  mg/dL


 


Creatinine   (0.8-1.3)  mg/dL


 


Glucose   ()  mg/dL


 


POC Glucose  109 H  ()  mg/dL


 


Hemoglobin A1c   (4-6)  %


 


Total Protein   (6.3-8.2)  g/dL














Assessment and Plan





Cultures:


9/27/2021 blood culture: In process





A/P:


56-year-old male with diabetes, hypertension admitted with:





#Sepsis, secondary to right diabetic foot infection with associated fifth toe 

osteomyelitis: Follows with outpatient podiatry, failed outpatient therapy.  

Non-smoker. 





#Diabetes uncontrolled





#GERARD: Renally dose antibiotics





Recs:


-Empiric ceftriaxone, Flagyl and vancomycin


-Arterial studies, wound culture ordered


-General surgery consult to evaluate for debridement and possible toe amputation







Jodie Horne MD, FACP


[facility] ([facility])


O: [phone]


F: [phone]

## 2021-09-28 NOTE — PROGRESS NOTE
Assessment and Plan


Assessment and plan: 





Right foot cellulitis


-On IV ABX


-Cultures pending


-Supportive care





Right foot gas gangrene


-Foot x-ray reveals Gas is seen in the soft tissues of the fifth metacarpal 

joint


-Cultures pending


-On IV ABX


--ID and Ortho consult pending


-Wound care education pending


-Wound care consult pending





Suspicion for osteomyelitis


-Foot x-ray shows suspicion for osteomyelitis


-MRI pending


-Cultures pending


-On IV ABX


-ID and Ortho consult pending


-May resume outpatient follow-up with podiatry as needed once discharge





GERARD


-Cr on admission 1.8


-Hydrate with IVF


-Avoid nephrotoxic agents


-Renal dose all meds


-Monitor renal function





DM2


-Uncontrolled


-HgbA1c 11.8


-POC BG monitoring


-Schedule Lantus and SSI coverage prn


-May benefit from diabetic education





HTN


-Monitor BP


-Resume home hypertensive meds, to optimize BP





DVT and GI PPX


-On heparin and Pepcid








History


Interval history: 





No new issues overnight.





Hospitalist Physical





- Constitutional


Vitals: 


                                        











Temp Pulse Resp BP Pulse Ox


 


 99.1 F   108 H  18   151/88   97 


 


 09/27/21 22:37  09/27/21 22:37  09/27/21 22:37  09/27/21 22:37  09/27/21 22:37











General appearance: Present: no acute distress, well-nourished





- EENT


Eyes: Present: PERRL, EOM intact


ENT: hearing intact, clear oral mucosa, dentition normal





- Neck


Neck: Present: supple, normal ROM





- Respiratory


Respiratory effort: normal


Respiratory: bilateral: CTA





- Cardiovascular


Rhythm: regular


Heart Sounds: Present: S1 & S2.  Absent: gallop, rub





- Extremities


Extremities: no ischemia, No edema, Full ROM





- Abdominal


General gastrointestinal: soft, non-tender, non-distended, normal bowel sounds





- Integumentary


Integumentary: Present: clear, warm, dry





- Neurologic


Neurologic: CNII-XII intact, moves all extremities





Results





- Labs


CBC & Chem 7: 


                                 09/27/21 23:17





                                 09/27/21 23:17


Labs: 


                             Laboratory Last Values











WBC  15.4 K/mm3 (4.5-11.0)  H  09/27/21  23:17    


 


RBC  4.40 M/mm3 (3.65-5.03)   09/27/21  23:17    


 


Hgb  10.9 gm/dl (11.8-15.2)  L  09/27/21  23:17    


 


Hct  34.3 % (35.5-45.6)  L  09/27/21  23:17    


 


MCV  78 fl (84-94)  L  09/27/21  23:17    


 


MCH  25 pg (28-32)  L  09/27/21  23:17    


 


MCHC  32 % (32-34)   09/27/21  23:17    


 


RDW  14.3 % (13.2-15.2)   09/27/21  23:17    


 


Plt Count  492 K/mm3 (140-440)  H  09/27/21  23:17    


 


Lymph % (Auto)  12.9 % (13.4-35.0)  L  09/27/21  23:17    


 


Mono % (Auto)  7.8 % (0.0-7.3)  H  09/27/21  23:17    


 


Eos % (Auto)  0.2 % (0.0-4.3)   09/27/21  23:17    


 


Baso % (Auto)  1.1 % (0.0-1.8)   09/27/21  23:17    


 


Lymph # (Auto)  2.0 K/mm3 (1.2-5.4)   09/27/21  23:17    


 


Mono # (Auto)  1.2 K/mm3 (0.0-0.8)  H  09/27/21  23:17    


 


Eos # (Auto)  0.0 K/mm3 (0.0-0.4)   09/27/21  23:17    


 


Baso # (Auto)  0.2 K/mm3 (0.0-0.1)  H  09/27/21  23:17    


 


Seg Neutrophils %  78.0 % (40.0-70.0)  H  09/27/21  23:17    


 


Seg Neutrophils #  12.0 K/mm3 (1.8-7.7)  H  09/27/21  23:17    


 


Sodium  134 mmol/L (137-145)  L  09/27/21  23:17    


 


Potassium  4.2 mmol/L (3.6-5.0)   09/27/21  23:17    


 


Chloride  93.1 mmol/L ()  L  09/27/21  23:17    


 


Carbon Dioxide  29 mmol/L (22-30)   09/27/21  23:17    


 


Anion Gap  16 mmol/L  09/27/21  23:17    


 


BUN  40 mg/dL (9-20)  H  09/27/21  23:17    


 


Creatinine  1.8 mg/dL (0.8-1.3)  H  09/27/21  23:17    


 


Estimated GFR  47 ml/min  09/27/21  23:17    


 


BUN/Creatinine Ratio  22 %  09/27/21  23:17    


 


Glucose  178 mg/dL ()  H  09/27/21  23:17    


 


POC Glucose  109 mg/dL ()  H  09/28/21  06:35    


 


Hemoglobin A1c  11.8 % (4-6)  H  09/27/21  23:17    


 


Lactic Acid  1.80 mmol/L (0.7-2.0)   09/27/21  23:17    


 


Calcium  10.0 mg/dL (8.4-10.2)   09/27/21  23:17    


 


Total Bilirubin  0.40 mg/dL (0.1-1.2)   09/27/21  23:17    


 


Direct Bilirubin  < 0.2 mg/dL (0-0.2)   09/27/21  23:17    


 


Indirect Bilirubin  0.2 mg/dL  09/27/21  23:17    


 


AST  11 units/L (5-40)   09/27/21  23:17    


 


ALT  11 units/L (7-56)   09/27/21  23:17    


 


Alkaline Phosphatase  107 units/L ()   09/27/21  23:17    


 


Total Protein  8.6 g/dL (6.3-8.2)  H  09/27/21  23:17    


 


Albumin  4.0 g/dL (3.9-5)   09/27/21  23:17    


 


Albumin/Globulin Ratio  0.9 %  09/27/21  23:17    











Microbiology: 


Microbiology





09/27/21 23:17   Peripheral/Venous   Blood Culture - Preliminary


                            Culture in Progress


09/27/21 23:13   Peripheral/Venous   Blood Culture - Preliminary


                            Culture in Progress











Active Medications





- Current Medications


Current Medications: 














Generic Name Dose Route Start Last Admin





  Trade Name Freq  PRN Reason Stop Dose Admin


 


Acetaminophen  650 mg  09/28/21 00:25 





  Acetaminophen 325 Mg Tab  PO  





  Q4H PRN  





  Pain MILD(1-3)/Fever >100.5/HA  


 


Amlodipine Besylate  10 mg  09/28/21 10:00 





  Amlodipine 10 Mg Tab  PO  





  DAILY [facility]  


 


Dextrose  50 ml  09/28/21 00:25 





  Dextrose 50% In Water (25gm) 50 Ml Syringe  IV  





  Q30MIN PRN  





  Hypoglycemia  





  Protocol  


 


Docusate Sodium  100 mg  09/28/21 10:00 





  Docusate Sodium 100 Mg Cap  PO  





  BID [facility]  


 


Famotidine  10 mg  09/28/21 10:00 





  Famotidine 20 Mg/2 Ml Inj  IV  





  BID [facility]  


 


Gabapentin  100 mg  09/28/21 10:00 





  Gabapentin 100 Mg Cap  PO  





  DAILY [facility]  


 


Heparin Sodium (Porcine)  5,000 unit  09/28/21 10:00 





  Heparin 5,000 Unit/1 Ml Vial  SUB-Q  





  Q12HR [facility]  


 


Sodium Chloride  1,000 mls @ 75 mls/hr  09/28/21 01:00 





  Nacl 0.45% 1000 Ml  IV  09/28/21 12:00 





  AS DIRECT [facility]  


 


Piperacillin Sod/Tazobactam Sod  4.5 gm in 100 mls @ 200 mls/hr  09/28/21 08:00 





  Zosyn/Ns 4.5gm/100ml  IV  





  Q8H [facility]  





  Protocol  


 


Vancomycin HCl 1,500 mg/  530 mls @ 333.333 mls/hr  09/29/21 03:00 





  Sodium Chloride  IV  





  Q24H [facility]  


 


Insulin Glargine  15 units  09/28/21 08:00 





  Insulin Glargine 100 Units/Ml  SUB-Q  





  QAMDIAB [facility]  


 


Insulin Human Lispro  0 unit  09/28/21 07:30 





  Insulin Lispro 100 Unit/Ml  SUB-Q  





  ACHS [facility]  





  Protocol  


 


Morphine Sulfate  4 mg  09/28/21 00:27 





  Morphine 4 Mg/1 Ml Inj  IV  





  Q4H PRN  





  Pain , Severe (7-10)  


 


Naloxone HCl  0.1 mg  09/28/21 00:27 





  Naloxone 0.4 Mg/1 Ml Inj  IV  





  Q2MIN PRN  





  Res Rate </= 8 or 02 SAT < 92%  


 


Ondansetron HCl  4 mg  09/28/21 00:25 





  Ondansetron 4 Mg/2 Ml Inj  IV  





  Q6H PRN  





  Nausea And Vomiting  


 


Oxycodone/Acetaminophen  1 tab  09/28/21 00:27 





  Oxycodone /Acetaminophen 5-325mg Tab  PO  





  Q6H PRN  





  Pain, Moderate (4-6)  


 


Pravastatin Sodium  40 mg  09/28/21 22:00 





  Pravastatin 40 Mg Tab  PO  





  QHS [facility]  


 


Sodium Chloride  10 ml  09/28/21 10:00 





  Sodium Chloride 0.9% 10 Ml Flush Syringe  IV  





  BID [facility]  


 


Sodium Chloride  10 ml  09/28/21 00:25 





  Sodium Chloride 0.9% 10 Ml Flush Syringe  IV  





  PRN PRN  





  LINE FLUSH

## 2021-09-29 LAB
BUN SERPL-MCNC: 31 MG/DL (ref 9–20)
BUN/CREAT SERPL: 21 %
CALCIUM SERPL-MCNC: 9.4 MG/DL (ref 8.4–10.2)
HEMOLYSIS INDEX: 0

## 2021-09-29 RX ADMIN — METRONIDAZOLE SCH MLS/HR: 5 INJECTION, SOLUTION INTRAVENOUS at 05:00

## 2021-09-29 RX ADMIN — FAMOTIDINE SCH MG: 10 INJECTION, SOLUTION INTRAVENOUS at 10:41

## 2021-09-29 RX ADMIN — INSULIN LISPRO SCH UNIT: 100 INJECTION, SOLUTION INTRAVENOUS; SUBCUTANEOUS at 13:11

## 2021-09-29 RX ADMIN — DOCUSATE SODIUM SCH MG: 100 CAPSULE, LIQUID FILLED ORAL at 10:41

## 2021-09-29 RX ADMIN — Medication SCH ML: at 10:42

## 2021-09-29 RX ADMIN — DOCUSATE SODIUM SCH MG: 100 CAPSULE, LIQUID FILLED ORAL at 22:11

## 2021-09-29 RX ADMIN — INSULIN LISPRO SCH UNIT: 100 INJECTION, SOLUTION INTRAVENOUS; SUBCUTANEOUS at 10:43

## 2021-09-29 RX ADMIN — HEPARIN SODIUM SCH UNIT: 5000 INJECTION, SOLUTION INTRAVENOUS; SUBCUTANEOUS at 10:44

## 2021-09-29 RX ADMIN — PRAVASTATIN SODIUM SCH MG: 40 TABLET ORAL at 22:11

## 2021-09-29 RX ADMIN — HEPARIN SODIUM SCH UNIT: 5000 INJECTION, SOLUTION INTRAVENOUS; SUBCUTANEOUS at 22:10

## 2021-09-29 RX ADMIN — GABAPENTIN SCH MG: 100 CAPSULE ORAL at 10:41

## 2021-09-29 RX ADMIN — FAMOTIDINE SCH MG: 10 INJECTION, SOLUTION INTRAVENOUS at 22:10

## 2021-09-29 RX ADMIN — METRONIDAZOLE SCH MLS/HR: 5 INJECTION, SOLUTION INTRAVENOUS at 14:14

## 2021-09-29 RX ADMIN — INSULIN LISPRO SCH UNIT: 100 INJECTION, SOLUTION INTRAVENOUS; SUBCUTANEOUS at 17:17

## 2021-09-29 RX ADMIN — INSULIN LISPRO SCH: 100 INJECTION, SOLUTION INTRAVENOUS; SUBCUTANEOUS at 07:27

## 2021-09-29 RX ADMIN — INSULIN LISPRO SCH UNIT: 100 INJECTION, SOLUTION INTRAVENOUS; SUBCUTANEOUS at 23:28

## 2021-09-29 RX ADMIN — INSULIN GLARGINE SCH UNITS: 100 INJECTION, SOLUTION SUBCUTANEOUS at 11:19

## 2021-09-29 RX ADMIN — VANCOMYCIN HYDROCHLORIDE SCH MLS/HR: 5 INJECTION, POWDER, LYOPHILIZED, FOR SOLUTION INTRAVENOUS at 02:51

## 2021-09-29 RX ADMIN — Medication SCH ML: at 22:10

## 2021-09-29 RX ADMIN — CEFTRIAXONE SODIUM SCH MLS/HR: 2 INJECTION, POWDER, FOR SOLUTION INTRAMUSCULAR; INTRAVENOUS at 10:41

## 2021-09-29 RX ADMIN — METRONIDAZOLE SCH MLS/HR: 5 INJECTION, SOLUTION INTRAVENOUS at 22:10

## 2021-09-29 NOTE — PROGRESS NOTE
Assessment and Plan





Cultures:


9/27/2021 blood culture: no growth





A/P:


56-year-old male with diabetes, hypertension admitted with:





#Sepsis, secondary to right diabetic foot infection with associated fifth toe 

osteomyelitis, gangrenous change: Follows with outpatient podiatry, failed 

outpatient therapy.  Non-smoker. 





#Diabetes uncontrolled





#GERARD: Renally dose antibiotics





#Peripheral vascular disease: noted on arterial US. 





Recs:


-continue ceftriaxone, Flagyl and vancomycin


-vascular surgery consult


-wound culture ordered yesterday, not collected yet


-awaiting general surgery consult to evaluate for debridement and possible toe 

amputation 





D/W Dr. Bello Horne MD, FACP


[facility] ([facility])


O: [phone]


F: [phone]





Subjective


Date of service: 09/29/21


Interval history: 





No fever. No complaints. Awaiting eval by surgery. Tolerating abx. 





Objective





- Exam


Narrative Exam: 





Physical Exam: 


Constitutional: Alert, cooperative. No acute distress


Head, Ears, Nose: Normocephalic, atraumatic. External ears, nose normal


Eyes: Conjunctivae/corneas clear. No icterus. No ptosis.


Neck: Supple, no meningeal signs


Cardiovascular: S1, S2 +


Respiratory: Good air entry, clear to auscultation bilaterally


GI: Soft, non-tender; bowel sounds normal. No peritoneal signs


Musculoskeletal: Right foot in dressing.


Skin: No rash or abscess


Hem/Lymphatic: No palpable cervical or supraclavicular nodes. No lymphangitis


Psych: Mood ok. Affect normal


Neurological: Awake, alert, oriented. No gross abnormality  





- Constitutional


Vitals: 


                                   Vital Signs











Temp Pulse Resp BP Pulse Ox


 


 98.2 F   99 H  18   124/84   99 


 


 09/29/21 08:28  09/29/21 08:28  09/29/21 08:28  09/29/21 08:28  09/29/21 08:28








                           Temperature -Last 24 Hours











Temperature                    98.2 F


 


Temperature                    98.3 F


 


Temperature                    98.2 F


 


Temperature                    98.0 F


 


Temperature                    98.8 F

















- Labs


CBC & Chem 7: 


                                 09/27/21 23:17





                                 09/29/21 07:09


Labs: 


                              Abnormal lab results











  09/28/21 09/28/21 09/29/21 Range/Units





  18:12 21:18 07:09 


 


Sodium    133 L  (137-145)  mmol/L


 


Chloride    96.1 L  ()  mmol/L


 


Carbon Dioxide    31 H  (22-30)  mmol/L


 


BUN    31 H  (9-20)  mg/dL


 


Creatinine    1.5 H  (0.8-1.3)  mg/dL


 


Glucose    334 H  ()  mg/dL


 


POC Glucose  215 H  220 H   ()  mg/dL














  09/29/21 Range/Units





  07:43 


 


Sodium   (137-145)  mmol/L


 


Chloride   ()  mmol/L


 


Carbon Dioxide   (22-30)  mmol/L


 


BUN   (9-20)  mg/dL


 


Creatinine   (0.8-1.3)  mg/dL


 


Glucose   ()  mg/dL


 


POC Glucose  289 H  ()  mg/dL

## 2021-09-29 NOTE — PROGRESS NOTE
Assessment and Plan


Assessment and plan: 





Right foot cellulitis


-On IV ABX


-Cultures pending


-Supportive care





Right foot gas gangrene


-Foot x-ray reveals Gas is seen in the soft tissues of the fifth metacarpal 

joint


-Cultures pending


-On IV ABX


--ID and Ortho consult pending


-Wound care education pending


-Wound care consult pending





Osteomyelitis of the fifth metatarsal and proximal phalanx of fifth toe


MRI confirms osteomyelitis of the fifth metatarsal and proximal phalanx of the 

fifth toe.


-ID and Ortho consult pending





GERARD


-Cr on admission 1.8


-Hydrate with IVF


-Avoid nephrotoxic agents


-Renal dose all meds


-Monitor renal function





DM2


-Uncontrolled


-HgbA1c 11.8


-POC BG monitoring


-Schedule Lantus and SSI coverage prn


-May benefit from diabetic education





HTN


-Monitor BP


-Resume home hypertensive meds, to optimize BP





DVT and GI PPX


-On heparin and Pepcid





9/29/2021. MRI confirms osteomyelitis of the fifth metatarsal and proximal 

phalanx of the fifth toe. Patient also noted to have tissue wound at the lateral

foot adjacent to the fifth metatarsal with gas in the subcutaneous tissues. 

Continue IV antibiotics of ceftriaxone, Flagyl and vancomycin per ID 

recommendations. Surgery consultation for further evaluation.





History


Interval history: 





No new issues overnight.





Hospitalist Physical





- Constitutional


Vitals: 


                                        











Temp Pulse Resp BP Pulse Ox


 


 98.2 F   99 H  18   124/84   99 


 


 09/29/21 08:28  09/29/21 08:28  09/29/21 08:28  09/29/21 08:28  09/29/21 08:28











General appearance: Present: no acute distress, well-nourished





- EENT


Eyes: Present: PERRL, EOM intact


ENT: hearing intact, clear oral mucosa, dentition normal





- Neck


Neck: Present: supple, normal ROM





- Respiratory


Respiratory effort: normal


Respiratory: bilateral: CTA





- Cardiovascular


Rhythm: regular


Heart Sounds: Present: S1 & S2.  Absent: gallop, rub





- Extremities


Extremities: no ischemia, No edema, Full ROM





- Abdominal


General gastrointestinal: soft, non-tender, non-distended, normal bowel sounds





- Integumentary


Integumentary: Present: clear, warm, dry





- Neurologic


Neurologic: CNII-XII intact, moves all extremities





Results





- Labs


CBC & Chem 7: 


                                 09/27/21 23:17





                                 09/29/21 07:09


Labs: 


                             Laboratory Last Values











WBC  15.4 K/mm3 (4.5-11.0)  H  09/27/21  23:17    


 


RBC  4.40 M/mm3 (3.65-5.03)   09/27/21  23:17    


 


Hgb  10.9 gm/dl (11.8-15.2)  L  09/27/21  23:17    


 


Hct  34.3 % (35.5-45.6)  L  09/27/21  23:17    


 


MCV  78 fl (84-94)  L  09/27/21  23:17    


 


MCH  25 pg (28-32)  L  09/27/21  23:17    


 


MCHC  32 % (32-34)   09/27/21  23:17    


 


RDW  14.3 % (13.2-15.2)   09/27/21  23:17    


 


Plt Count  492 K/mm3 (140-440)  H  09/27/21  23:17    


 


Lymph % (Auto)  12.9 % (13.4-35.0)  L  09/27/21  23:17    


 


Mono % (Auto)  7.8 % (0.0-7.3)  H  09/27/21  23:17    


 


Eos % (Auto)  0.2 % (0.0-4.3)   09/27/21  23:17    


 


Baso % (Auto)  1.1 % (0.0-1.8)   09/27/21  23:17    


 


Lymph # (Auto)  2.0 K/mm3 (1.2-5.4)   09/27/21  23:17    


 


Mono # (Auto)  1.2 K/mm3 (0.0-0.8)  H  09/27/21  23:17    


 


Eos # (Auto)  0.0 K/mm3 (0.0-0.4)   09/27/21  23:17    


 


Baso # (Auto)  0.2 K/mm3 (0.0-0.1)  H  09/27/21  23:17    


 


Seg Neutrophils %  78.0 % (40.0-70.0)  H  09/27/21  23:17    


 


Seg Neutrophils #  12.0 K/mm3 (1.8-7.7)  H  09/27/21  23:17    


 


Sodium  133 mmol/L (137-145)  L  09/29/21  07:09    


 


Potassium  4.6 mmol/L (3.6-5.0)   09/29/21  07:09    


 


Chloride  96.1 mmol/L ()  L  09/29/21  07:09    


 


Carbon Dioxide  31 mmol/L (22-30)  H  09/29/21  07:09    


 


Anion Gap  11 mmol/L  09/29/21  07:09    


 


BUN  31 mg/dL (9-20)  H  09/29/21  07:09    


 


Creatinine  1.5 mg/dL (0.8-1.3)  H  09/29/21  07:09    


 


Estimated GFR  58 ml/min  09/29/21  07:09    


 


BUN/Creatinine Ratio  21 %  09/29/21  07:09    


 


Glucose  334 mg/dL ()  H  09/29/21  07:09    


 


POC Glucose  289 mg/dL ()  H  09/29/21  07:43    


 


Hemoglobin A1c  11.8 % (4-6)  H  09/27/21  23:17    


 


Lactic Acid  1.80 mmol/L (0.7-2.0)   09/27/21  23:17    


 


Calcium  9.4 mg/dL (8.4-10.2)   09/29/21  07:09    


 


Total Bilirubin  0.40 mg/dL (0.1-1.2)   09/27/21  23:17    


 


Direct Bilirubin  < 0.2 mg/dL (0-0.2)   09/27/21  23:17    


 


Indirect Bilirubin  0.2 mg/dL  09/27/21  23:17    


 


AST  11 units/L (5-40)   09/27/21  23:17    


 


ALT  11 units/L (7-56)   09/27/21  23:17    


 


Alkaline Phosphatase  107 units/L ()   09/27/21  23:17    


 


Total Protein  8.6 g/dL (6.3-8.2)  H  09/27/21  23:17    


 


Albumin  4.0 g/dL (3.9-5)   09/27/21  23:17    


 


Albumin/Globulin Ratio  0.9 %  09/27/21  23:17    











Microbiology: 


Microbiology





09/27/21 23:17   Peripheral/Venous   Blood Culture - Preliminary


                            NO GROWTH AFTER 24 HOURS


09/27/21 23:13   Peripheral/Venous   Blood Culture - Preliminary


                            NO GROWTH AFTER 24 HOURS








Orr/IV: 


                                        





Voiding Method                   Toilet











Active Medications





- Current Medications


Current Medications: 














Generic Name Dose Route Start Last Admin





  Trade Name Freq  PRN Reason Stop Dose Admin


 


Acetaminophen  650 mg  09/28/21 00:25 





  Acetaminophen 325 Mg Tab  PO  





  Q4H PRN  





  Pain MILD(1-3)/Fever >100.5/HA  


 


Amlodipine Besylate  10 mg  09/28/21 10:00  09/28/21 14:16





  Amlodipine 10 Mg Tab  PO   10 mg





  DAILY PAULY   Administration


 


Dextrose  50 ml  09/28/21 00:25 





  Dextrose 50% In Water (25gm) 50 Ml Syringe  IV  





  Q30MIN PRN  





  Hypoglycemia  





  Protocol  


 


Docusate Sodium  100 mg  09/28/21 10:00  09/28/21 21:59





  Docusate Sodium 100 Mg Cap  PO   100 mg





  BID PAULY   Administration


 


Famotidine  10 mg  09/28/21 10:00  09/28/21 21:59





  Famotidine 20 Mg/2 Ml Inj  IV   10 mg





  BID PAULY   Administration


 


Gabapentin  100 mg  09/28/21 10:00  09/28/21 14:16





  Gabapentin 100 Mg Cap  PO   100 mg





  DAILY PAULY   Administration


 


Heparin Sodium (Porcine)  5,000 unit  09/28/21 10:00  09/28/21 21:58





  Heparin 5,000 Unit/1 Ml Vial  SUB-Q   5,000 unit





  Q12HR PAULY   Administration


 


Vancomycin HCl 1,500 mg/  530 mls @ 333.333 mls/hr  09/29/21 03:00  09/29/21 

02:51





  Sodium Chloride  IV   333.333 mls/hr





  Q24H PAULY   Administration


 


Ceftriaxone Sodium  2 gm in 100 mls @ 200 mls/hr  09/28/21 14:00  09/28/21 18:06





  Rocephin/Ns 2 Gm/100 Ml  IV   Infused





  Q24HR PAULY   Infusion





  Protocol  


 


Metronidazole  500 mg in 100 mls @ 100 mls/hr  09/28/21 14:00  09/29/21 05:00





  Flagyl 500 Mg/100 Ml  IV   100 mls/hr





  Q8H PAULY   Administration





  Protocol  


 


Insulin Glargine  15 units  09/28/21 08:00  09/28/21 13:00





  Insulin Glargine 100 Units/Ml  SUB-Q   Not Given





  QAMDIAB [facility]  


 


Insulin Human Lispro  0 unit  09/28/21 07:30  09/29/21 07:27





  Insulin Lispro 100 Unit/Ml  SUB-Q   Not Given





  AC[facility]  





  Protocol  


 


Morphine Sulfate  4 mg  09/28/21 00:27 





  Morphine 4 Mg/1 Ml Inj  IV  





  Q4H PRN  





  Pain , Severe (7-10)  


 


Naloxone HCl  0.1 mg  09/28/21 00:27 





  Naloxone 0.4 Mg/1 Ml Inj  IV  





  Q2MIN PRN  





  Res Rate </= 8 or 02 SAT < 92%  


 


Ondansetron HCl  4 mg  09/28/21 00:25 





  Ondansetron 4 Mg/2 Ml Inj  IV  





  Q6H PRN  





  Nausea And Vomiting  


 


Oxycodone/Acetaminophen  1 tab  09/28/21 00:27 





  Oxycodone /Acetaminophen 5-325mg Tab  PO  





  Q6H PRN  





  Pain, Moderate (4-6)  


 


Pravastatin Sodium  40 mg  09/28/21 22:00  09/28/21 21:59





  Pravastatin 40 Mg Tab  PO   40 mg





  QHS PAULY   Administration


 


Sodium Chloride  10 ml  09/28/21 10:00  09/28/21 22:00





  Sodium Chloride 0.9% 10 Ml Flush Syringe  IV   10 ml





  BID PAULY   Administration


 


Sodium Chloride  10 ml  09/28/21 00:25 





  Sodium Chloride 0.9% 10 Ml Flush Syringe  IV  





  PRN PRN  





  LINE FLUSH

## 2021-09-29 NOTE — CONSULTATION
History of Present Illness


Consult date: 09/29/21


Chief complaint: 





right foot wound





- History of present illness


History of present illness: 


57 yo male with hx of DM and right foot wound who presents to ER with worsening 

of the wound. States wound has been present for several weeks. He has been 

seeing his podiatrist and underwent debridement once. He was started on abx. He 

was supposed to follow up this week but came to ER instead. He states there has 

been minimal drainage. He noticed more black skin around the wound and this 

worried him. He denies trauma to the area prior to developing wound. No f/c, cp,

sob, n/v, abd pain, c/d. He in not on insulin as outpatient for his DM.








Past History


Past Medical History: diabetes, hypertension


Past Surgical History: Other (Right knee surgery)


Social history: , lives with family, full code.  denies: smoking, alcohol

abuse, prescription drug abuse, IV drug use


Family history: CAD, diabetes, hypertension





Medications and Allergies


                                    Allergies











Allergy/AdvReac Type Severity Reaction Status Date / Time


 


No Known Allergies Allergy   Verified 09/27/21 22:32











                                Home Medications











 Medication  Instructions  Recorded  Confirmed  Last Taken  Type


 


Amlodipine Besylate [Norvasc] 10 mg PO DAILY 03/06/15 09/29/21 03/05/15 History


 


Gabapentin 100 mg PO DAILY 03/06/15 09/29/21 03/05/15 History


 


Losartan/Hydrochlorothiazide 100 - 125 each PO DAILY 03/06/15 09/29/21 03/05/15 

History





[Hyzaar 100-25 TAB]     


 


Metformin HCl [Fortamet ER] 1,000 mg PO QDAY 03/06/15 09/29/21 03/05/15 History


 


Simvastatin 20 mg PO QHS 03/06/15 09/29/21 03/05/15 History


 


glipiZIDE [Glucotrol] 10 mg PO QDAY 03/06/15 09/29/21 03/05/15 History


 


Amoxicillin/K Clav Tab [Augmentin 1 each PO Q12H #10 day 03/09/15 09/29/21 

Unknown Rx





875MG TAB]     











Active Meds: 


Active Medications





Acetaminophen (Acetaminophen 325 Mg Tab)  650 mg PO Q4H PRN


   PRN Reason: Pain MILD(1-3)/Fever >100.5/HA


Amlodipine Besylate (Amlodipine 10 Mg Tab)  10 mg PO DAILY PAULY


   Last Admin: 09/29/21 10:41 Dose:  10 mg


   Documented by: 


Dextrose (Dextrose 50% In Water (25gm) 50 Ml Syringe)  50 ml IV Q30MIN PRN; 

Protocol


   PRN Reason: Hypoglycemia


Docusate Sodium (Docusate Sodium 100 Mg Cap)  100 mg PO BID [facility]


   Last Admin: 09/29/21 10:41 Dose:  100 mg


   Documented by: 


Famotidine (Famotidine 20 Mg/2 Ml Inj)  10 mg IV BID [facility]


   Last Admin: 09/29/21 10:41 Dose:  10 mg


   Documented by: 


Gabapentin (Gabapentin 100 Mg Cap)  100 mg PO DAILY [facility]


   Last Admin: 09/29/21 10:41 Dose:  100 mg


   Documented by: 


Heparin Sodium (Porcine) (Heparin 5,000 Unit/1 Ml Vial)  5,000 unit SUB-Q Q12HR 

[facility]


   Last Admin: 09/29/21 10:44 Dose:  5,000 unit


   Documented by: 


Vancomycin HCl 1,500 mg/ (Sodium Chloride)  530 mls @ 333.333 mls/hr IV Q24H [facility]


   Last Admin: 09/29/21 02:51 Dose:  333.333 mls/hr


   Documented by: 


Ceftriaxone Sodium (Rocephin/Ns 2 Gm/100 Ml)  2 gm in 100 mls @ 200 mls/hr IV 

Q24HR [facility]; Protocol


   Last Admin: 09/29/21 10:41 Dose:  200 mls/hr


   Documented by: 


Metronidazole (Flagyl 500 Mg/100 Ml)  500 mg in 100 mls @ 100 mls/hr IV Q8H [facility];

Protocol


   Last Admin: 09/29/21 14:14 Dose:  100 mls/hr


   Documented by: 


Insulin Glargine (Insulin Glargine 100 Units/Ml)  15 units SUB-Q QAMDIAB [facility]


   Last Admin: 09/29/21 11:19 Dose:  15 units


   Documented by: 


Insulin Human Lispro (Insulin Lispro 100 Unit/Ml)  0 unit SUB-Q ACHS [facility]; 

Protocol


   Last Admin: 09/29/21 13:11 Dose:  8 unit


   Documented by: 


Morphine Sulfate (Morphine 4 Mg/1 Ml Inj)  4 mg IV Q4H PRN


   PRN Reason: Pain , Severe (7-10)


Naloxone HCl (Naloxone 0.4 Mg/1 Ml Inj)  0.1 mg IV Q2MIN PRN


   PRN Reason: Res Rate </= 8 or 02 SAT < 92%


Ondansetron HCl (Ondansetron 4 Mg/2 Ml Inj)  4 mg IV Q6H PRN


   PRN Reason: Nausea And Vomiting


Oxycodone/Acetaminophen (Oxycodone /Acetaminophen 5-325mg Tab)  1 tab PO Q6H PRN


   PRN Reason: Pain, Moderate (4-6)


Pravastatin Sodium (Pravastatin 40 Mg Tab)  40 mg PO QHS [facility]


   Last Admin: 09/28/21 21:59 Dose:  40 mg


   Documented by: 


Sodium Chloride (Sodium Chloride 0.9% 10 Ml Flush Syringe)  10 ml IV BID [facility]


   Last Admin: 09/29/21 10:42 Dose:  10 ml


   Documented by: 


Sodium Chloride (Sodium Chloride 0.9% 10 Ml Flush Syringe)  10 ml IV PRN PRN


   PRN Reason: LINE FLUSH











Review of Systems


All systems: negative (10 point ROS performed and negative except for that 

listed in HPI)





Exam


                                   Vital Signs











Temp Pulse Resp BP Pulse Ox


 


 99.1 F   108 H  18   151/88   97 


 


 09/27/21 22:37  09/27/21 22:37  09/27/21 22:37  09/27/21 22:37  09/27/21 22:37











Narrative exam: 





Gen.: Awake, alert, oriented x3.  No apparent distress


ENT: Trachea midline.  No lymphadenopathy.  No scleral icterus or conjunctival 

pallor


CV: S1, S2 present


Respiratory: No audible wheezes


Extremities: Bilateral lower extremities are warm.  Cannot palpate pulses right 

foot.  There is no edema.  There is a necrotic wound of the fifth toe which 

extends to the lateral and posterior portion of the distal foot.  There is a 

small opening with minimal purulent drainage.  There is wet gangrene of the fift

h toe.  Patient cannot delineate between sharp and dull sensation.  4 x 4 gauze 

was applied to the wound and this was secured with kerlix.





Results





- Labs





                                 09/27/21 23:17





                                 09/29/21 07:09


                              Abnormal lab results











  09/28/21 09/28/21 09/29/21 Range/Units





  18:12 21:18 07:09 


 


Sodium    133 L  (137-145)  mmol/L


 


Chloride    96.1 L  ()  mmol/L


 


Carbon Dioxide    31 H  (22-30)  mmol/L


 


BUN    31 H  (9-20)  mg/dL


 


Creatinine    1.5 H  (0.8-1.3)  mg/dL


 


Glucose    334 H  ()  mg/dL


 


POC Glucose  215 H  220 H   ()  mg/dL














  09/29/21 09/29/21 Range/Units





  07:43 11:18 


 


Sodium    (137-145)  mmol/L


 


Chloride    ()  mmol/L


 


Carbon Dioxide    (22-30)  mmol/L


 


BUN    (9-20)  mg/dL


 


Creatinine    (0.8-1.3)  mg/dL


 


Glucose    ()  mg/dL


 


POC Glucose  289 H  352 H  ()  mg/dL








                                 Diabetes panel











  09/29/21 Range/Units





  07:09 


 


Sodium  133 L  (137-145)  mmol/L


 


Potassium  4.6  (3.6-5.0)  mmol/L


 


Chloride  96.1 L  ()  mmol/L


 


Carbon Dioxide  31 H  (22-30)  mmol/L


 


BUN  31 H  (9-20)  mg/dL


 


Creatinine  1.5 H  (0.8-1.3)  mg/dL


 


Glucose  334 H  ()  mg/dL


 


Calcium  9.4  (8.4-10.2)  mg/dL








                                  Calcium panel











  09/29/21 Range/Units





  07:09 


 


Calcium  9.4  (8.4-10.2)  mg/dL








                                 Pituitary panel











  09/29/21 Range/Units





  07:09 


 


Sodium  133 L  (137-145)  mmol/L


 


Potassium  4.6  (3.6-5.0)  mmol/L


 


Chloride  96.1 L  ()  mmol/L


 


Carbon Dioxide  31 H  (22-30)  mmol/L


 


BUN  31 H  (9-20)  mg/dL


 


Creatinine  1.5 H  (0.8-1.3)  mg/dL


 


Glucose  334 H  ()  mg/dL


 


Calcium  9.4  (8.4-10.2)  mg/dL








                                  Adrenal panel











  09/29/21 Range/Units





  07:09 


 


Sodium  133 L  (137-145)  mmol/L


 


Potassium  4.6  (3.6-5.0)  mmol/L


 


Chloride  96.1 L  ()  mmol/L


 


Carbon Dioxide  31 H  (22-30)  mmol/L


 


BUN  31 H  (9-20)  mg/dL


 


Creatinine  1.5 H  (0.8-1.3)  mg/dL


 


Glucose  334 H  ()  mg/dL


 


Calcium  9.4  (8.4-10.2)  mg/dL














- Imaging


Additional studies: 





X-ray right foot


MRI right foot


Arterial ultrasound lower extremity





Assessment and Plan





56-year-old male with





1.  Diabetes mellitus with open right foot wound


2.  Osteomyelitis


3.  Poorly controlled diabetes -Hb A1c 11


4.  Peripheral arterial disease





All pertinent imaging and laboratory data were reviewed.  Patient is afebrile, 

with stable vital signs.





Plan:


1. Consistent carb diet


2. strict glucose control


3. local wound care


4. vascular surgery consultation - d/w Dr. Alexis


5. Optimize nutrition -add protein supplement


6.  Patient will need amputation of the right fifth toe along with debridement 

of any additional necrotic tissue of the foot.  Will discuss with Dr. Chambers 

tomorrow.  


7. abx per ID





Discussed recommendations with the patient.  He is agreeable to the plan.





Thank you for this consultation. Please call with any questions or concerns.





Evaluation and treatment of this patient was during the time of the national and

state emergency arising from COVID19 coronavirus pandemic. Treatment and 

procedures performed meet the current and available best practice and guidelines

for patient during the COVID pandemic.

## 2021-09-30 LAB
HCT VFR BLD CALC: 30.9 % (ref 35.5–45.6)
HGB BLD-MCNC: 10.4 GM/DL (ref 11.8–15.2)
MCHC RBC AUTO-ENTMCNC: 34 % (ref 32–34)
MCV RBC AUTO: 78 FL (ref 84–94)
PLATELET # BLD: 458 K/MM3 (ref 140–440)
RBC # BLD AUTO: 3.96 M/MM3 (ref 3.65–5.03)

## 2021-09-30 PROCEDURE — B4101ZZ FLUOROSCOPY OF ABDOMINAL AORTA USING LOW OSMOLAR CONTRAST: ICD-10-PCS | Performed by: RADIOLOGY

## 2021-09-30 PROCEDURE — 047P3D1 DILATION OF RIGHT ANTERIOR TIBIAL ARTERY WITH INTRALUMINAL DEVICE, USING DRUG-COATED BALLOON, PERCUTANEOUS APPROACH: ICD-10-PCS | Performed by: RADIOLOGY

## 2021-09-30 PROCEDURE — 04CK3ZZ EXTIRPATION OF MATTER FROM RIGHT FEMORAL ARTERY, PERCUTANEOUS APPROACH: ICD-10-PCS | Performed by: RADIOLOGY

## 2021-09-30 PROCEDURE — 047M3D1 DILATION OF RIGHT POPLITEAL ARTERY WITH INTRALUMINAL DEVICE, USING DRUG-COATED BALLOON, PERCUTANEOUS APPROACH: ICD-10-PCS | Performed by: RADIOLOGY

## 2021-09-30 PROCEDURE — 04CM3ZZ EXTIRPATION OF MATTER FROM RIGHT POPLITEAL ARTERY, PERCUTANEOUS APPROACH: ICD-10-PCS | Performed by: RADIOLOGY

## 2021-09-30 PROCEDURE — 047K3D1 DILATION OF RIGHT FEMORAL ARTERY WITH INTRALUMINAL DEVICE, USING DRUG-COATED BALLOON, PERCUTANEOUS APPROACH: ICD-10-PCS | Performed by: RADIOLOGY

## 2021-09-30 PROCEDURE — B41F1ZZ FLUOROSCOPY OF RIGHT LOWER EXTREMITY ARTERIES USING LOW OSMOLAR CONTRAST: ICD-10-PCS | Performed by: RADIOLOGY

## 2021-09-30 PROCEDURE — B41G1ZZ FLUOROSCOPY OF LEFT LOWER EXTREMITY ARTERIES USING LOW OSMOLAR CONTRAST: ICD-10-PCS | Performed by: RADIOLOGY

## 2021-09-30 RX ADMIN — METRONIDAZOLE SCH MLS/HR: 5 INJECTION, SOLUTION INTRAVENOUS at 05:06

## 2021-09-30 RX ADMIN — CLOPIDOGREL BISULFATE SCH MG: 75 TABLET ORAL at 17:01

## 2021-09-30 RX ADMIN — DOCUSATE SODIUM SCH MG: 100 CAPSULE, LIQUID FILLED ORAL at 21:37

## 2021-09-30 RX ADMIN — FAMOTIDINE SCH MG: 10 INJECTION, SOLUTION INTRAVENOUS at 11:57

## 2021-09-30 RX ADMIN — PRAVASTATIN SODIUM SCH MG: 40 TABLET ORAL at 21:37

## 2021-09-30 RX ADMIN — ASPIRIN SCH MG: 81 TABLET, CHEWABLE ORAL at 17:01

## 2021-09-30 RX ADMIN — INSULIN LISPRO SCH: 100 INJECTION, SOLUTION INTRAVENOUS; SUBCUTANEOUS at 16:44

## 2021-09-30 RX ADMIN — METRONIDAZOLE SCH MLS/HR: 5 INJECTION, SOLUTION INTRAVENOUS at 21:38

## 2021-09-30 RX ADMIN — INSULIN LISPRO SCH UNIT: 100 INJECTION, SOLUTION INTRAVENOUS; SUBCUTANEOUS at 21:39

## 2021-09-30 RX ADMIN — HEPARIN SODIUM SCH: 5000 INJECTION, SOLUTION INTRAVENOUS; SUBCUTANEOUS at 11:54

## 2021-09-30 RX ADMIN — LIDOCAINE HYDROCHLORIDE ONE ML: 20 INJECTION, SOLUTION INFILTRATION; PERINEURAL at 09:40

## 2021-09-30 RX ADMIN — INSULIN GLARGINE SCH: 100 INJECTION, SOLUTION SUBCUTANEOUS at 11:09

## 2021-09-30 RX ADMIN — GABAPENTIN SCH: 100 CAPSULE ORAL at 11:57

## 2021-09-30 RX ADMIN — VANCOMYCIN HYDROCHLORIDE SCH MLS/HR: 5 INJECTION, POWDER, LYOPHILIZED, FOR SOLUTION INTRAVENOUS at 02:42

## 2021-09-30 RX ADMIN — CEFTRIAXONE SODIUM SCH MLS/HR: 2 INJECTION, POWDER, FOR SOLUTION INTRAMUSCULAR; INTRAVENOUS at 11:53

## 2021-09-30 RX ADMIN — Medication SCH ML: at 11:54

## 2021-09-30 RX ADMIN — LIDOCAINE HYDROCHLORIDE ONE ML: 20 INJECTION, SOLUTION INFILTRATION; PERINEURAL at 09:42

## 2021-09-30 RX ADMIN — INSULIN LISPRO SCH: 100 INJECTION, SOLUTION INTRAVENOUS; SUBCUTANEOUS at 08:52

## 2021-09-30 RX ADMIN — FAMOTIDINE SCH MG: 10 INJECTION, SOLUTION INTRAVENOUS at 21:37

## 2021-09-30 RX ADMIN — DOCUSATE SODIUM SCH: 100 CAPSULE, LIQUID FILLED ORAL at 11:57

## 2021-09-30 RX ADMIN — METRONIDAZOLE SCH MLS/HR: 5 INJECTION, SOLUTION INTRAVENOUS at 13:23

## 2021-09-30 RX ADMIN — INSULIN LISPRO SCH: 100 INJECTION, SOLUTION INTRAVENOUS; SUBCUTANEOUS at 11:58

## 2021-09-30 RX ADMIN — HEPARIN SODIUM SCH UNIT: 5000 INJECTION, SOLUTION INTRAVENOUS; SUBCUTANEOUS at 21:37

## 2021-09-30 RX ADMIN — Medication SCH ML: at 21:38

## 2021-09-30 NOTE — OPERATIVE REPORT
Operative Report


Operative Report: 





Exam: Right lower extremity revascularization





Clinical indication: Peripheral vascular disease with fifth toe and distal foot 

gangrene





Date: 09/30/2021





Procedure: Following an explanation of the risks, benefits and alternatives; 

written informed consent was obtained.  The patient was brought to the angiogr

aphic suite and placed in supine position on the examination table.  Initial 

ultrasound evaluation of the left groin demonstrated a patent left common 

femoral artery.  The patient's left groin was prepped and draped in the usual 

sterile fashion.  1% lidocaine was used for anesthesia.





Under ultrasound guidance, the left common femoral artery was cannulated with a 

7 cm 21-gauge needle.  A 0.018 guidewire was advanced centrally.  The needle was

removed and a microsheath placed.  A 0.018 guidewire was exchanged for a 0.035 

guidewire and the micro sheath exchanged for a 5 [place] vascular sheath.





An Omni Flush catheter was then advanced over the 035 wire and together 

guidewire and catheter advanced to the distal abdominal aorta.  The guidewire 

was removed.  Angiography was performed in the distal abdominal aorta which 

demonstrates a widely patent distal abdominal aorta, bilateral common iliac, 

bilateral external iliac and bilateral common femoral arteries.





The bifurcation was then crossed using the Omni Flush catheter and guidewire.  

Additional angiography of the right leg was performed with the Omni Flush 

catheter in the right common femoral artery, right superficial femoral artery p

roximal and distal and right popliteal artery.  This demonstrates a focal 80% 

stenosis within the mid SFA with collateral formation.  There is a second focal 

90% stenosis in the popliteal artery with collateral formation, the patient has 

an anterior and posterior tibial artery with a focal 70% stenosis at the origin 

of the anterior tibial artery with scattered atherosclerotic disease extending 

more distally.





At the 0.035 guidewire was advanced through the Omni Flush catheter and the Omni

Flush catheter exchanged for a vertebral catheter.  Together the vertebral 

catheter and guidewire were advanced into the mid anterior tibial artery.  The 

035 guidewire was removed and a 6 mm spider wire advanced through the vertebral 

catheter to place it at the origin of the anterior tibial artery.  The vertebral

catheter was removed.





Atherectomy of the proximal SFA stenosis was performed using a Hawk M 

atherectomy device.  Post atherectomy angioplasty was performed using a 6 mm x 

80 mm drug-coated balloon.  Atherectomy of the mid popliteal stenosis was 

performed using an Hawk M atherectomy device.  Post atherectomy angioplasty was 

performed using a 5 mm x 80 mm drug-coated balloon.  Treatment of the proximal 

anterior tibial artery lesion was performed using a 4 mm x 80 mm drug-coated 

balloon.





The vertebral catheter was advanced over the spider wire and the spider wire 

removed through the vertebral catheter. Post intervention imaging demonstrated 

brisk flow with reduction of the SFA stenosis to less than 20% and no obvious 

dissections noted.  Reduction of the popliteal artery stenosis to less than 20% 

and no obvious stenoses.  Reduction of the anterior tibial artery stenosis to 

less than 20% and no obvious stenosis.  The patient had palpable dorsalis pedis 

pulses at the conclusion of the procedure.





The trocar the sheath was advanced through the sheath over the guidewire and the

sheath proximal lysed.  The guidewire was advanced up the abdominal aorta and 

the sheath removed.  Hemostasis was achieved in the left groin using an Angio-

Seal arterial closure device and a compression dressing.





The patient tolerated the procedure well.  There were no immediate postprocedure

complications.





Conscious sedation was performed under the guidance of radiologic nursing.  

Continuous cardiopulmonary monitoring was utilized.





Impression: 


1) Right lower extremity revascularization with focal lesions in the SFA, 

popliteal artery and anterior tibial artery.  Revascularization of the SFA and 

popliteal artery using atherectomy and angioplasty.  Revascularization of the 

anterior tibial artery using angioplasty.  Palpable pulses at the conclusion of 

the procedure.  





2) The patient now has adequate inflow to heal his amputation and debridement.

## 2021-09-30 NOTE — CONSULTATION
History of Present Illness


Consult date: 09/30/21


Chief complaint: 





Gangrene of right 5th toe





- History of present illness


History of present illness: 





57 yo diabetic male, non-smoker with PVOD of the RLE and gangrene/osteomyelitis 

of the right 5th toe/metatarsal head.  Pt is now s/p revascularization by Dr. Alexis.





Past History


Past Medical History: diabetes, hypertension


Past Surgical History: Other (Right knee surgery)


Social history: , lives with family, full code.  denies: smoking, alcohol

abuse, prescription drug abuse, IV drug use


Family history: CAD, diabetes, hypertension





Medications and Allergies


                                    Allergies











Allergy/AdvReac Type Severity Reaction Status Date / Time


 


No Known Allergies Allergy   Verified 09/27/21 22:32











                                Home Medications











 Medication  Instructions  Recorded  Confirmed  Last Taken  Type


 


Amlodipine Besylate [Norvasc] 10 mg PO DAILY 03/06/15 09/29/21 03/05/15 History


 


Gabapentin 100 mg PO DAILY 03/06/15 09/29/21 03/05/15 History


 


Losartan/Hydrochlorothiazide 100 - 125 each PO DAILY 03/06/15 09/29/21 03/05/15 

History





[Hyzaar 100-25 TAB]     


 


Metformin HCl [Fortamet ER] 1,000 mg PO QDAY 03/06/15 09/29/21 03/05/15 History


 


Simvastatin 20 mg PO QHS 03/06/15 09/29/21 03/05/15 History


 


glipiZIDE [Glucotrol] 10 mg PO QDAY 03/06/15 09/29/21 03/05/15 History


 


Amoxicillin/K Clav Tab [Augmentin 1 each PO Q12H #10 day 03/09/15 09/29/21 

Unknown Rx





875MG TAB]     











Active Meds: 


Active Medications





Acetaminophen (Acetaminophen 325 Mg Tab)  650 mg PO Q4H PRN


   PRN Reason: Pain MILD(1-3)/Fever >100.5/HA


Amlodipine Besylate (Amlodipine 10 Mg Tab)  10 mg PO DAILY [facility]


   Last Admin: 09/30/21 11:57 Dose:  10 mg


   Documented by: 


Aspirin (Aspirin 81 Mg Tab Chew)  81 mg PO QDAY [facility]


Clopidogrel Bisulfate (Clopidogrel 75 Mg Tab)  75 mg PO QDAY [facility]


Dextrose (Dextrose 50% In Water (25gm) 50 Ml Syringe)  50 ml IV Q30MIN PRN; 

Protocol


   PRN Reason: Hypoglycemia


Docusate Sodium (Docusate Sodium 100 Mg Cap)  100 mg PO BID [facility]


   Last Admin: 09/30/21 11:57 Dose:  Not Given


   Documented by: 


Famotidine (Famotidine 20 Mg/2 Ml Inj)  10 mg IV BID [facility]


   Last Admin: 09/30/21 11:57 Dose:  10 mg


   Documented by: 


Gabapentin (Gabapentin 100 Mg Cap)  100 mg PO DAILY [facility]


   Last Admin: 09/30/21 11:57 Dose:  Not Given


   Documented by: 


Heparin Sodium (Porcine) (Heparin 5,000 Unit/1 Ml Vial)  5,000 unit SUB-Q Q12HR 

[facility]


   Last Admin: 09/30/21 11:54 Dose:  Not Given


   Documented by: 


Vancomycin HCl 1,500 mg/ (Sodium Chloride)  530 mls @ 333.333 mls/hr IV Q24H [facility]


   Last Admin: 09/30/21 02:42 Dose:  333.333 mls/hr


   Documented by: 


Ceftriaxone Sodium (Rocephin/Ns 2 Gm/100 Ml)  2 gm in 100 mls @ 200 mls/hr IV 

Q24HR [facility]; Protocol


   Last Admin: 09/30/21 11:53 Dose:  200 mls/hr


   Documented by: 


Metronidazole (Flagyl 500 Mg/100 Ml)  500 mg in 100 mls @ 100 mls/hr IV Q8H [facility];

Protocol


   Last Admin: 09/30/21 13:23 Dose:  100 mls/hr


   Documented by: 


Insulin Glargine (Insulin Glargine 100 Units/Ml)  15 units SUB-Q QAMDIAB [facility]


   Last Admin: 09/30/21 11:09 Dose:  Not Given


   Documented by: 


Insulin Human Lispro (Insulin Lispro 100 Unit/Ml)  0 unit SUB-Q ACHS [facility]; 

Protocol


   Last Admin: 09/30/21 16:44 Dose:  Not Given


   Documented by: 


Morphine Sulfate (Morphine 4 Mg/1 Ml Inj)  4 mg IV Q4H PRN


   PRN Reason: Pain , Severe (7-10)


Naloxone HCl (Naloxone 0.4 Mg/1 Ml Inj)  0.1 mg IV Q2MIN PRN


   PRN Reason: Res Rate </= 8 or 02 SAT < 92%


Ondansetron HCl (Ondansetron 4 Mg/2 Ml Inj)  4 mg IV Q6H PRN


   PRN Reason: Nausea And Vomiting


Oxycodone/Acetaminophen (Oxycodone /Acetaminophen 5-325mg Tab)  1 tab PO Q6H PRN


   PRN Reason: Pain, Moderate (4-6)


Pravastatin Sodium (Pravastatin 40 Mg Tab)  40 mg PO QHS [facility]


   Last Admin: 09/29/21 22:11 Dose:  40 mg


   Documented by: 


Sodium Chloride (Sodium Chloride 0.9% 10 Ml Flush Syringe)  10 ml IV BID [facility]


   Last Admin: 09/30/21 11:54 Dose:  10 ml


   Documented by: 


Sodium Chloride (Sodium Chloride 0.9% 10 Ml Flush Syringe)  10 ml IV PRN PRN


   PRN Reason: LINE FLUSH











Review of Systems


All systems: negative (none)





Exam


                                   Vital Signs











Temp Pulse Resp BP Pulse Ox


 


 99.1 F   108 H  18   151/88   97 


 


 09/27/21 22:37  09/27/21 22:37  09/27/21 22:37  09/27/21 22:37  09/27/21 22:37














- General physical appearance


Positive: well developed, well nourished, no distress





- Eyes


Positive: PERRL, normal occular movement





- ENT


Positive: normal pinna, normal nares, normal mucosa, no hearing loss, no 

congestion





- Neck


Positive: no masses, no bruits, trachea midline, no venous distension





- Respiratory


Positive: normal expansion, normal respiratory effort, clear to auscultation





- Cardiovascular


Rhythm: regular


Heart Sounds: Present: S1 & S2.  Absent: rub, click





- Extremities


Extremities: no ischemia, pulses symmetrical, No edema





- Breasts


Breasts: normal, no mass, no skin changes





- Abdomen


Abdomen: Present: soft, bowel sounds normal.  Absent: tender, distended


Hernia: none





- Genitourinary


Male Genitourinary: normal


Female Genitourinary: normal





- Integumentary


no rash, no growths, no abnormal pigmentation, other (There is dry gangrene of 

the right 5th toe and adjacent distal lateral foot.  )





- Neurologic


Neurologic: alert and oriented to time, place and person, motor strength and 

sensation are grossly intact





- Musculoskeletal


normal gait, normal posture





- Psychiatric


Psychiatric: appropriate mood/affect, intact judgment & insight





Results





- Labs





                                 09/30/21 04:49





                                 09/29/21 07:09


                              Abnormal lab results











  09/29/21 09/30/21 09/30/21 Range/Units





  20:39 04:49 11:35 


 


Hgb   10.4 L   (11.8-15.2)  gm/dl


 


Hct   30.9 L   (35.5-45.6)  %


 


MCV   78 L   (84-94)  fl


 


MCH   26 L   (28-32)  pg


 


Plt Count   458 H   (140-440)  K/mm3


 


POC Glucose  294 H   169 H  ()  mg/dL














  09/30/21 Range/Units





  15:54 


 


Hgb   (11.8-15.2)  gm/dl


 


Hct   (35.5-45.6)  %


 


MCV   (84-94)  fl


 


MCH   (28-32)  pg


 


Plt Count   (140-440)  K/mm3


 


POC Glucose  160 H  ()  mg/dL














- Imaging


Additional studies: 





MRI of right foot was reviewed.





Assessment and Plan





- Patient Problems


(1) Foot osteomyelitis, right


Current Visit: Yes   Status: Acute   


Qualifiers: 


   Osteomyelitis type: unspecified type   Qualified Code(s): M86.9 - 

Osteomyelitis, unspecified   


Plan to address problem: 


1) NPO after MN


 2) TMA of right 5th toe tomorrow

## 2021-09-30 NOTE — PROGRESS NOTE
Assessment and Plan





Cultures:


9/27/2021 blood culture: no growth





A/P:


56-year-old male with diabetes, hypertension admitted with:





#Sepsis, secondary to right diabetic foot infection with associated fifth toe 

osteomyelitis, gangrenous change: Follows with outpatient podiatry, failed 

outpatient therapy.  Non-smoker. 





#Diabetes uncontrolled





#GERARD: Renally dose antibiotics





#Peripheral vascular disease: noted on arterial US. Status post RLE 

revascularization 9/30/2021





Recs:


-continue ceftriaxone, Flagyl and vancomycin


-wound culture, not collected yet, please send deep cultures from surgery


-Awaiting debridement and amputation of right fifth toe by Dr. Chambers.





Jodie Horne MD, [facility]


[facility] ([facility]


O: [phone]


F: [phone]





Subjective


Date of service: 09/30/21


Interval history: 





No fever.  Was seen by general surgery and vascular surgery.  Underwent RLE 

revascularization today.





Objective





- Exam


Narrative Exam: 





Physical Exam: 


Constitutional: Alert, cooperative. No acute distress


Head, Ears, Nose: Normocephalic, atraumatic. External ears, nose normal


Eyes: Conjunctivae/corneas clear. No icterus. No ptosis.


Neck: Supple, no meningeal signs


Cardiovascular: S1, S2 +


Respiratory: Good air entry, clear to auscultation bilaterally


GI: Soft, non-tender; bowel sounds normal. No peritoneal signs


Musculoskeletal: Right foot in dressing.


Skin: No rash or abscess


Hem/Lymphatic: No palpable cervical or supraclavicular nodes. No lymphangitis


Psych: Mood ok. Affect normal


Neurological: Awake, alert, oriented. No gross abnormality   





- Constitutional


Vitals: 


                                   Vital Signs











Temp Pulse Resp BP Pulse Ox


 


 98.3 F   86   16   151/84   95 


 


 09/30/21 11:36  09/30/21 11:57  09/30/21 11:36  09/30/21 11:57  09/30/21 11:36








                           Temperature -Last 24 Hours











Temperature                    98.3 F


 


Temperature                    98.6 F


 


Temperature                    98.8 F


 


Temperature                    98.8 F


 


Temperature                    98.6 F


 


Temperature                    98.2 F

















- Labs


CBC & Chem 7: 


                                 09/30/21 04:49





                                 09/29/21 07:09


Labs: 


                              Abnormal lab results











  09/29/21 09/29/21 09/30/21 Range/Units





  15:56 20:39 04:49 


 


Hgb    10.4 L  (11.8-15.2)  gm/dl


 


Hct    30.9 L  (35.5-45.6)  %


 


MCV    78 L  (84-94)  fl


 


MCH    26 L  (28-32)  pg


 


Plt Count    458 H  (140-440)  K/mm3


 


POC Glucose  346 H  294 H   ()  mg/dL














  09/30/21 Range/Units





  11:35 


 


Hgb   (11.8-15.2)  gm/dl


 


Hct   (35.5-45.6)  %


 


MCV   (84-94)  fl


 


MCH   (28-32)  pg


 


Plt Count   (140-440)  K/mm3


 


POC Glucose  169 H  ()  mg/dL

## 2021-09-30 NOTE — CONSULTATION
History of Present Illness





- Reason for Consult


Consult date: 09/30/21


Right leg peripheral vascular disease with gangrene





- History of Present Illness





Patient with a history of right fifth toe gangrene extending onto his foot who 

had been undergoing debridement in the outpatient setting.  Patient presented to

the ER.  Underwent arterial duplex which demonstrated significant SFA disease.  

Patient with planned amputation of his fifth toe.  Not currently in any pain.  

Patient is resting comfortably at time of examination.





Past History


Past Medical History: diabetes, hypertension


Past Surgical History: Other (Right knee surgery)


Social history: , lives with family, full code.  denies: smoking, alcohol

abuse, prescription drug abuse, IV drug use


Family history: CAD, diabetes, hypertension





Medications and Allergies


                                    Allergies











Allergy/AdvReac Type Severity Reaction Status Date / Time


 


No Known Allergies Allergy   Verified 09/27/21 22:32











                                Home Medications











 Medication  Instructions  Recorded  Confirmed  Last Taken  Type


 


Amlodipine Besylate [Norvasc] 10 mg PO DAILY 03/06/15 09/29/21 03/05/15 History


 


Gabapentin 100 mg PO DAILY 03/06/15 09/29/21 03/05/15 History


 


Losartan/Hydrochlorothiazide 100 - 125 each PO DAILY 03/06/15 09/29/21 03/05/15 

History





[Hyzaar 100-25 TAB]     


 


Metformin HCl [Fortamet ER] 1,000 mg PO QDAY 03/06/15 09/29/21 03/05/15 History


 


Simvastatin 20 mg PO QHS 03/06/15 09/29/21 03/05/15 History


 


glipiZIDE [Glucotrol] 10 mg PO QDAY 03/06/15 09/29/21 03/05/15 History


 


Amoxicillin/K Clav Tab [Augmentin 1 each PO Q12H #10 day 03/09/15 09/29/21 

Unknown Rx





875MG TAB]     











Active Meds: 


Active Medications





Acetaminophen (Acetaminophen 325 Mg Tab)  650 mg PO Q4H PRN


   PRN Reason: Pain MILD(1-3)/Fever >100.5/HA


Amlodipine Besylate (Amlodipine 10 Mg Tab)  10 mg PO DAILY [facility]


   Last Admin: 09/29/21 10:41 Dose:  10 mg


   Documented by: 


Dextrose (Dextrose 50% In Water (25gm) 50 Ml Syringe)  50 ml IV Q30MIN PRN; 

Protocol


   PRN Reason: Hypoglycemia


Docusate Sodium (Docusate Sodium 100 Mg Cap)  100 mg PO BID [facility]


   Last Admin: 09/29/21 22:11 Dose:  100 mg


   Documented by: 


Famotidine (Famotidine 20 Mg/2 Ml Inj)  10 mg IV BID [facility]


   Last Admin: 09/29/21 22:10 Dose:  10 mg


   Documented by: 


Gabapentin (Gabapentin 100 Mg Cap)  100 mg PO DAILY [facility]


   Last Admin: 09/29/21 10:41 Dose:  100 mg


   Documented by: 


Heparin Sodium (Porcine) (Heparin 5,000 Unit/1 Ml Vial)  5,000 unit SUB-Q Q12HR 

[facility]


   Last Admin: 09/29/21 22:10 Dose:  5,000 unit


   Documented by: 


Vancomycin HCl 1,500 mg/ (Sodium Chloride)  530 mls @ 333.333 mls/hr IV Q24H [facility]


   Last Admin: 09/30/21 02:42 Dose:  333.333 mls/hr


   Documented by: 


Ceftriaxone Sodium (Rocephin/Ns 2 Gm/100 Ml)  2 gm in 100 mls @ 200 mls/hr IV 

Q24HR [facility]; Protocol


   Last Admin: 09/29/21 10:41 Dose:  200 mls/hr


   Documented by: 


Metronidazole (Flagyl 500 Mg/100 Ml)  500 mg in 100 mls @ 100 mls/hr IV Q8H [facility];

Protocol


   Last Admin: 09/30/21 05:06 Dose:  100 mls/hr


   Documented by: 


Insulin Glargine (Insulin Glargine 100 Units/Ml)  15 units SUB-Q QAMDIAB [facility]


   Last Admin: 09/29/21 11:19 Dose:  15 units


   Documented by: 


Insulin Human Lispro (Insulin Lispro 100 Unit/Ml)  0 unit SUB-Q ACHS [facility]; 

Protocol


   Last Admin: 09/30/21 08:52 Dose:  Not Given


   Documented by: 


Morphine Sulfate (Morphine 4 Mg/1 Ml Inj)  4 mg IV Q4H PRN


   PRN Reason: Pain , Severe (7-10)


Naloxone HCl (Naloxone 0.4 Mg/1 Ml Inj)  0.1 mg IV Q2MIN PRN


   PRN Reason: Res Rate </= 8 or 02 SAT < 92%


Ondansetron HCl (Ondansetron 4 Mg/2 Ml Inj)  4 mg IV Q6H PRN


   PRN Reason: Nausea And Vomiting


Oxycodone/Acetaminophen (Oxycodone /Acetaminophen 5-325mg Tab)  1 tab PO Q6H PRN


   PRN Reason: Pain, Moderate (4-6)


Pravastatin Sodium (Pravastatin 40 Mg Tab)  40 mg PO QHS [facility]


   Last Admin: 09/29/21 22:11 Dose:  40 mg


   Documented by: 


Sodium Chloride (Sodium Chloride 0.9% 10 Ml Flush Syringe)  10 ml IV BID [facility]


   Last Admin: 09/29/21 22:10 Dose:  10 ml


   Documented by: 


Sodium Chloride (Sodium Chloride 0.9% 10 Ml Flush Syringe)  10 ml IV PRN PRN


   PRN Reason: LINE FLUSH











Review of Systems


All systems: negative





Exam





- Constitutional


Vitals: 


                                        











Temp Pulse Resp BP Pulse Ox


 


 98.6 F   99 H  18   132/86   98 


 


 09/30/21 08:14  09/30/21 08:14  09/30/21 08:14  09/30/21 08:14  09/30/21 08:14











General appearance: Present: no acute distress





- EENT


Eyes: Present: EOM intact


ENT: hearing intact





- Neck


Neck: Present: supple, normal ROM





- Respiratory


Respiratory effort: normal





- Extremities


Extremities: abnormal





- Abdominal


General gastrointestinal: Present: deferred


Male genitourinary: Present: deferred





- Rectal


Rectal Exam: deferred





- Psychiatric


Psychiatric: appropriate mood/affect, cooperative





Results





- Labs


CBC & Chem 7: 


                                 09/30/21 04:49





                                 09/29/21 07:09


Labs: 


                              Abnormal lab results











  09/29/21 09/29/21 09/29/21 Range/Units





  11:18 15:56 20:39 


 


Hgb     (11.8-15.2)  gm/dl


 


Hct     (35.5-45.6)  %


 


MCV     (84-94)  fl


 


MCH     (28-32)  pg


 


Plt Count     (140-440)  K/mm3


 


POC Glucose  352 H  346 H  294 H  ()  mg/dL














  09/30/21 Range/Units





  04:49 


 


Hgb  10.4 L  (11.8-15.2)  gm/dl


 


Hct  30.9 L  (35.5-45.6)  %


 


MCV  78 L  (84-94)  fl


 


MCH  26 L  (28-32)  pg


 


Plt Count  458 H  (140-440)  K/mm3


 


POC Glucose   ()  mg/dL














- Imaging and Cardiology


Venous US: image reviewed (Arterial duplex)





Assessment and Plan





Patient will be brought down to the Cath Lab this morning for planned 

revascularization of his right leg.  Following this, the patient will be 

scheduled for amputation of his fifth toe with general surgery per their 

timetable.

## 2021-09-30 NOTE — PROGRESS NOTE
Assessment and Plan


Assessment and plan: 





Right foot cellulitis


-On IV ABX


-Blood cultures negative x48-hour





Right foot gas gangrene


-Foot x-ray reveals Gas is seen in the soft tissues of the fifth metacarpal 

joint


-Cultures pending


-On IV ABX


-ID, vascular and general surgery following





Osteomyelitis of the fifth metatarsal and proximal phalanx of fifth toe


MRI confirms osteomyelitis of the fifth metatarsal and proximal phalanx of the 

fifth toe.





GERARD


-Cr on admission 1.8


-Hydrate with IVF


-Avoid nephrotoxic agents


-Renal dose all meds


-Monitor renal function





DM2


-Uncontrolled


-HgbA1c 11.8


-POC BG monitoring


-Schedule Lantus and SSI coverage prn


-May benefit from diabetic education





HTN


-Monitor BP


-Resume home hypertensive meds, to optimize BP





DVT and GI PPX


-On heparin and Pepcid





9/29/2021. MRI confirms osteomyelitis of the fifth metatarsal and proximal 

phalanx of the fifth toe. Patient also noted to have tissue wound at the lateral

foot adjacent to the fifth metatarsal with gas in the subcutaneous tissues. 

Continue IV antibiotics of ceftriaxone, Flagyl and vancomycin per ID 

recommendations. Surgery consultation for further evaluation.





9/30/2021.  Patient seen in the Cath Lab.  Case discussed with Dr. Alexis.  

Patient with significant SFA disease and is currently undergoing 

revascularization of his right leg.  General surgery likely to perform 

amputation of his fifth toe.  Follow-up creatinine in a.m. after procedure.





History


Interval history: 





No new issues overnight.  Patient seen in the Cath Lab.  Case discussed with Dr. Alexis





Hospitalist Physical





- Constitutional


Vitals: 


                                        











Temp Pulse Resp BP Pulse Ox


 


 98.6 F   99 H  18   132/86   98 


 


 09/30/21 08:14  09/30/21 08:14  09/30/21 08:14  09/30/21 08:14  09/30/21 08:14











General appearance: Present: no acute distress





- EENT


Eyes: Present: PERRL, EOM intact


ENT: hearing intact, clear oral mucosa, dentition normal





- Neck


Neck: Present: supple, normal ROM





- Respiratory


Respiratory effort: normal


Respiratory: bilateral: CTA





- Cardiovascular


Rhythm: regular


Heart Sounds: Present: S1 & S2.  Absent: gallop, rub





- Extremities


Extremities: no ischemia, No edema, Full ROM





- Abdominal


General gastrointestinal: soft, non-tender, non-distended, normal bowel sounds





- Integumentary


Integumentary: Present: clear, warm, dry





- Neurologic


Neurologic: CNII-XII intact, moves all extremities





Results





- Labs


CBC & Chem 7: 


                                 09/30/21 04:49





                                 09/29/21 07:09


Labs: 


                             Laboratory Last Values











WBC  10.2 K/mm3 (4.5-11.0)   09/30/21  04:49    


 


RBC  3.96 M/mm3 (3.65-5.03)   09/30/21  04:49    


 


Hgb  10.4 gm/dl (11.8-15.2)  L  09/30/21  04:49    


 


Hct  30.9 % (35.5-45.6)  L  09/30/21  04:49    


 


MCV  78 fl (84-94)  L  09/30/21  04:49    


 


MCH  26 pg (28-32)  L  09/30/21  04:49    


 


MCHC  34 % (32-34)   09/30/21  04:49    


 


RDW  14.2 % (13.2-15.2)   09/30/21  04:49    


 


Plt Count  458 K/mm3 (140-440)  H  09/30/21  04:49    


 


Lymph % (Auto)  12.9 % (13.4-35.0)  L  09/27/21  23:17    


 


Mono % (Auto)  7.8 % (0.0-7.3)  H  09/27/21  23:17    


 


Eos % (Auto)  0.2 % (0.0-4.3)   09/27/21  23:17    


 


Baso % (Auto)  1.1 % (0.0-1.8)   09/27/21  23:17    


 


Lymph # (Auto)  2.0 K/mm3 (1.2-5.4)   09/27/21  23:17    


 


Mono # (Auto)  1.2 K/mm3 (0.0-0.8)  H  09/27/21  23:17    


 


Eos # (Auto)  0.0 K/mm3 (0.0-0.4)   09/27/21  23:17    


 


Baso # (Auto)  0.2 K/mm3 (0.0-0.1)  H  09/27/21  23:17    


 


Seg Neutrophils %  78.0 % (40.0-70.0)  H  09/27/21  23:17    


 


Seg Neutrophils #  12.0 K/mm3 (1.8-7.7)  H  09/27/21  23:17    


 


Sodium  133 mmol/L (137-145)  L  09/29/21  07:09    


 


Potassium  4.6 mmol/L (3.6-5.0)   09/29/21  07:09    


 


Chloride  96.1 mmol/L ()  L  09/29/21  07:09    


 


Carbon Dioxide  31 mmol/L (22-30)  H  09/29/21  07:09    


 


Anion Gap  11 mmol/L  09/29/21  07:09    


 


BUN  31 mg/dL (9-20)  H  09/29/21  07:09    


 


Creatinine  1.5 mg/dL (0.8-1.3)  H  09/29/21  07:09    


 


Estimated GFR  58 ml/min  09/29/21  07:09    


 


BUN/Creatinine Ratio  21 %  09/29/21  07:09    


 


Glucose  334 mg/dL ()  H  09/29/21  07:09    


 


POC Glucose  294 mg/dL ()  H  09/29/21  20:39    


 


Hemoglobin A1c  11.8 % (4-6)  H  09/27/21  23:17    


 


Lactic Acid  1.80 mmol/L (0.7-2.0)   09/27/21  23:17    


 


Calcium  9.4 mg/dL (8.4-10.2)   09/29/21  07:09    


 


Total Bilirubin  0.40 mg/dL (0.1-1.2)   09/27/21  23:17    


 


Direct Bilirubin  < 0.2 mg/dL (0-0.2)   09/27/21  23:17    


 


Indirect Bilirubin  0.2 mg/dL  09/27/21  23:17    


 


AST  11 units/L (5-40)   09/27/21  23:17    


 


ALT  11 units/L (7-56)   09/27/21  23:17    


 


Alkaline Phosphatase  107 units/L ()   09/27/21  23:17    


 


Total Protein  8.6 g/dL (6.3-8.2)  H  09/27/21  23:17    


 


Albumin  4.0 g/dL (3.9-5)   09/27/21  23:17    


 


Albumin/Globulin Ratio  0.9 %  09/27/21  23:17    











Microbiology: 


Microbiology





09/27/21 23:17   Peripheral/Venous   Blood Culture - Preliminary


                            NO GROWTH AFTER 48 HOURS


09/27/21 23:13   Peripheral/Venous   Blood Culture - Preliminary


                            NO GROWTH AFTER 48 HOURS








Orr/IV: 


                                        





Voiding Method                   Toilet











Active Medications





- Current Medications


Current Medications: 














Generic Name Dose Route Start Last Admin





  Trade Name Freq  PRN Reason Stop Dose Admin


 


Acetaminophen  650 mg  09/28/21 00:25 





  Acetaminophen 325 Mg Tab  PO  





  Q4H PRN  





  Pain MILD(1-3)/Fever >100.5/HA  


 


Amlodipine Besylate  10 mg  09/28/21 10:00  09/29/21 10:41





  Amlodipine 10 Mg Tab  PO   10 mg





  DAILY PAULY   Administration


 


Dextrose  50 ml  09/28/21 00:25 





  Dextrose 50% In Water (25gm) 50 Ml Syringe  IV  





  Q30MIN PRN  





  Hypoglycemia  





  Protocol  


 


Docusate Sodium  100 mg  09/28/21 10:00  09/29/21 22:11





  Docusate Sodium 100 Mg Cap  PO   100 mg





  BID PAULY   Administration


 


Famotidine  10 mg  09/28/21 10:00  09/29/21 22:10





  Famotidine 20 Mg/2 Ml Inj  IV   10 mg





  BID PAULY   Administration


 


Gabapentin  100 mg  09/28/21 10:00  09/29/21 10:41





  Gabapentin 100 Mg Cap  PO   100 mg





  DAILY PAULY   Administration


 


Heparin Sodium (Porcine)  5,000 unit  09/28/21 10:00  09/29/21 22:10





  Heparin 5,000 Unit/1 Ml Vial  SUB-Q   5,000 unit





  Q12HR PAULY   Administration


 


Vancomycin HCl 1,500 mg/  530 mls @ 333.333 mls/hr  09/29/21 03:00  09/30/21 

02:42





  Sodium Chloride  IV   333.333 mls/hr





  Q24H PAULY   Administration


 


Ceftriaxone Sodium  2 gm in 100 mls @ 200 mls/hr  09/28/21 14:00  09/29/21 10:41





  Rocephin/Ns 2 Gm/100 Ml  IV   200 mls/hr





  Q24HR PAULY   Administration





  Protocol  


 


Metronidazole  500 mg in 100 mls @ 100 mls/hr  09/28/21 14:00  09/30/21 05:06





  Flagyl 500 Mg/100 Ml  IV   100 mls/hr





  Q8H PAULY   Administration





  Protocol  


 


Insulin Glargine  15 units  09/28/21 08:00  09/29/21 11:19





  Insulin Glargine 100 Units/Ml  SUB-Q   15 units





  QAMDIAB PAULY   Administration


 


Insulin Human Lispro  0 unit  09/28/21 07:30  09/30/21 08:52





  Insulin Lispro 100 Unit/Ml  SUB-Q   Not Given





  [facility]  





  Protocol  


 


Morphine Sulfate  4 mg  09/28/21 00:27 





  Morphine 4 Mg/1 Ml Inj  IV  





  Q4H PRN  





  Pain , Severe (7-10)  


 


Naloxone HCl  0.1 mg  09/28/21 00:27 





  Naloxone 0.4 Mg/1 Ml Inj  IV  





  Q2MIN PRN  





  Res Rate </= 8 or 02 SAT < 92%  


 


Ondansetron HCl  4 mg  09/28/21 00:25 





  Ondansetron 4 Mg/2 Ml Inj  IV  





  Q6H PRN  





  Nausea And Vomiting  


 


Oxycodone/Acetaminophen  1 tab  09/28/21 00:27 





  Oxycodone /Acetaminophen 5-325mg Tab  PO  





  Q6H PRN  





  Pain, Moderate (4-6)  


 


Pravastatin Sodium  40 mg  09/28/21 22:00  09/29/21 22:11





  Pravastatin 40 Mg Tab  PO   40 mg





  QHS PAULY   Administration


 


Sodium Chloride  10 ml  09/28/21 10:00  09/29/21 22:10





  Sodium Chloride 0.9% 10 Ml Flush Syringe  IV   10 ml





  BID PAULY   Administration


 


Sodium Chloride  10 ml  09/28/21 00:25 





  Sodium Chloride 0.9% 10 Ml Flush Syringe  IV  





  PRN PRN  





  LINE FLUSH

## 2021-10-01 LAB
BASOPHILS # (AUTO): 0.1 K/MM3 (ref 0–0.1)
BASOPHILS NFR BLD AUTO: 0.8 % (ref 0–1.8)
BUN SERPL-MCNC: 25 MG/DL (ref 9–20)
BUN/CREAT SERPL: 19 %
CALCIUM SERPL-MCNC: 9.3 MG/DL (ref 8.4–10.2)
EOSINOPHIL # BLD AUTO: 0.1 K/MM3 (ref 0–0.4)
EOSINOPHIL NFR BLD AUTO: 1.4 % (ref 0–4.3)
HCT VFR BLD CALC: 28.4 % (ref 35.5–45.6)
HEMOLYSIS INDEX: 1
HGB BLD-MCNC: 9.9 GM/DL (ref 11.8–15.2)
LYMPHOCYTES # BLD AUTO: 1.9 K/MM3 (ref 1.2–5.4)
LYMPHOCYTES NFR BLD AUTO: 18.7 % (ref 13.4–35)
MCHC RBC AUTO-ENTMCNC: 35 % (ref 32–34)
MCV RBC AUTO: 77 FL (ref 84–94)
MONOCYTES # (AUTO): 0.9 K/MM3 (ref 0–0.8)
MONOCYTES % (AUTO): 8.4 % (ref 0–7.3)
PLATELET # BLD: 446 K/MM3 (ref 140–440)
RBC # BLD AUTO: 3.67 M/MM3 (ref 3.65–5.03)

## 2021-10-01 PROCEDURE — 0Y6M0ZF DETACHMENT AT RIGHT FOOT, PARTIAL 5TH RAY, OPEN APPROACH: ICD-10-PCS | Performed by: RADIOLOGY

## 2021-10-01 RX ADMIN — ASPIRIN SCH MG: 81 TABLET, CHEWABLE ORAL at 11:00

## 2021-10-01 RX ADMIN — CLOPIDOGREL BISULFATE SCH MG: 75 TABLET ORAL at 11:00

## 2021-10-01 RX ADMIN — INSULIN GLARGINE SCH: 100 INJECTION, SOLUTION SUBCUTANEOUS at 08:20

## 2021-10-01 RX ADMIN — METRONIDAZOLE SCH MLS/HR: 5 INJECTION, SOLUTION INTRAVENOUS at 05:10

## 2021-10-01 RX ADMIN — HEPARIN SODIUM SCH UNIT: 5000 INJECTION, SOLUTION INTRAVENOUS; SUBCUTANEOUS at 22:46

## 2021-10-01 RX ADMIN — INSULIN LISPRO SCH: 100 INJECTION, SOLUTION INTRAVENOUS; SUBCUTANEOUS at 08:01

## 2021-10-01 RX ADMIN — DOCUSATE SODIUM SCH MG: 100 CAPSULE, LIQUID FILLED ORAL at 11:00

## 2021-10-01 RX ADMIN — GABAPENTIN SCH MG: 100 CAPSULE ORAL at 11:00

## 2021-10-01 RX ADMIN — ASPIRIN SCH: 81 TABLET, CHEWABLE ORAL at 11:13

## 2021-10-01 RX ADMIN — INSULIN LISPRO SCH UNIT: 100 INJECTION, SOLUTION INTRAVENOUS; SUBCUTANEOUS at 16:48

## 2021-10-01 RX ADMIN — PRAVASTATIN SODIUM SCH MG: 40 TABLET ORAL at 22:45

## 2021-10-01 RX ADMIN — HEPARIN SODIUM SCH: 5000 INJECTION, SOLUTION INTRAVENOUS; SUBCUTANEOUS at 11:01

## 2021-10-01 RX ADMIN — VANCOMYCIN HYDROCHLORIDE SCH MLS/HR: 5 INJECTION, POWDER, LYOPHILIZED, FOR SOLUTION INTRAVENOUS at 02:23

## 2021-10-01 RX ADMIN — FAMOTIDINE SCH MG: 10 INJECTION, SOLUTION INTRAVENOUS at 11:01

## 2021-10-01 RX ADMIN — METRONIDAZOLE SCH MLS/HR: 5 INJECTION, SOLUTION INTRAVENOUS at 22:47

## 2021-10-01 RX ADMIN — Medication SCH ML: at 22:51

## 2021-10-01 RX ADMIN — CEFTRIAXONE SODIUM SCH MLS/HR: 2 INJECTION, POWDER, FOR SOLUTION INTRAMUSCULAR; INTRAVENOUS at 10:59

## 2021-10-01 RX ADMIN — INSULIN LISPRO SCH UNIT: 100 INJECTION, SOLUTION INTRAVENOUS; SUBCUTANEOUS at 23:30

## 2021-10-01 RX ADMIN — DOCUSATE SODIUM SCH MG: 100 CAPSULE, LIQUID FILLED ORAL at 22:45

## 2021-10-01 RX ADMIN — METRONIDAZOLE SCH: 5 INJECTION, SOLUTION INTRAVENOUS at 17:00

## 2021-10-01 RX ADMIN — FAMOTIDINE SCH MG: 10 INJECTION, SOLUTION INTRAVENOUS at 22:45

## 2021-10-01 RX ADMIN — Medication SCH ML: at 11:01

## 2021-10-01 RX ADMIN — INSULIN LISPRO SCH: 100 INJECTION, SOLUTION INTRAVENOUS; SUBCUTANEOUS at 14:57

## 2021-10-01 NOTE — ANESTHESIA CONSULTATION
<LOC LOVING - Last Filed: 10/01/21 12:23>





Anesthesia Consult and Med Hx


Date of service: 10/01/21





- Airway


Anesthetic Teeth Evaluation: Good


ROM Head & Neck: Adequate


Mental/Hyoid Distance: Adequate


Mallampati Class: Class II


Intubation Access Assessment: Good





- Pulmonary Exam


CTA: Yes





- Cardiac Exam


Cardiac Exam: No Murmur





- Pre-Operative Health Status


ASA Pre-Surgery Classification: ASA2


Proposed Anesthetic Plan: General





- Pulmonary


COPD: No





- Cardiovascular System


Hx Hypertension: Yes





- Gastrointestinal


Hx Gastroesophageal Reflux Disease: No





- Endocrine


Hx End Stage Renal Disease: No


Hx Insulin Dependent Diabetes: No


Hx Non-Insulin Dependent Diabetes: Yes





- Other Systems


Hx Cancer: No





- Additional Comments


Anesthesia Medical History Comments: H/H : 9.9/28.4.  K+: 4.7.  FBS@12pm: 238





<JEFF SAWYER - Last Filed: 10/01/21 15:08>





Anesthesia Consult and Med Hx





- Pre-Operative Health Status


ASA Pre-Surgery Classification: ASA3

## 2021-10-01 NOTE — PROGRESS NOTE
Assessment and Plan





Cultures:


9/27/2021 blood culture: no growth





A/P:


56-year-old male with diabetes, hypertension admitted with:





#Sepsis, secondary to right diabetic foot infection with associated fifth toe 

osteomyelitis, gangrenous change: Follows with outpatient podiatry, failed 

outpatient therapy.  Non-smoker. 





#Diabetes uncontrolled





#GERARD: improved





#Peripheral vascular disease: noted on arterial US. Status post RLE 

revascularization 9/30/2021





Recs:


-continue Ceftriaxone, Flagyl and vancomycin


-wound culture, not collected yet, please send deep cultures from surgery


-Awaiting debridement and TMA of right fifth toe


-Follow-up cultures, anticipate discharge on oral antibiotics following surgical

source control








Jodie Horne MD, FACP


[facility] ([facility])


O: [phone]


F: [phone]





Subjective


Date of service: 10/01/21


Interval history: 


No fever.  No complaints. Awaiting surgery.





Objective





- Exam


Narrative Exam: 





Physical Exam: 


Constitutional: Alert, cooperative. No acute distress


Head, Ears, Nose: Normocephalic, atraumatic. External ears, nose normal


Eyes: Conjunctivae/corneas clear. No icterus. No ptosis.


Neck: Supple, no meningeal signs


Cardiovascular: S1, S2 +


Respiratory: Good air entry, clear to auscultation bilaterally


GI: Soft, non-tender; bowel sounds normal. No peritoneal signs


Musculoskeletal: Right foot in dressing, gangrenous 5th toe.


Skin: No rash or abscess


Hem/Lymphatic: No palpable cervical or supraclavicular nodes. No lymphangitis


Psych: Mood ok. Affect normal


Neurological: Awake, alert, oriented. No gross abnormality    





- Constitutional


Vitals: 


                                   Vital Signs











Temp Pulse Resp BP Pulse Ox


 


 98.5 F   97 H  18   135/89   98 


 


 10/01/21 12:00  10/01/21 12:01  10/01/21 12:00  10/01/21 12:00  10/01/21 12:01








                           Temperature -Last 24 Hours











Temperature                    98.5 F


 


Temperature                    98.4 F


 


Temperature                    98.4 F


 


Temperature                    98.6 F


 


Temperature                    98.0 F

















- Labs


CBC & Chem 7: 


                                 10/01/21 04:47





                                 10/01/21 04:47


Labs: 


                              Abnormal lab results











  09/30/21 09/30/21 10/01/21 Range/Units





  15:54 20:56 04:47 


 


Hgb    9.9 L  (11.8-15.2)  gm/dl


 


Hct    28.4 L  (35.5-45.6)  %


 


MCV    77 L  (84-94)  fl


 


MCH    27 L  (28-32)  pg


 


MCHC    35 H  (32-34)  %


 


Plt Count    446 H  (140-440)  K/mm3


 


Mono % (Auto)    8.4 H  (0.0-7.3)  %


 


Mono # (Auto)    0.9 H  (0.0-0.8)  K/mm3


 


Seg Neutrophils %    70.7 H  (40.0-70.0)  %


 


Sodium     (137-145)  mmol/L


 


Chloride     ()  mmol/L


 


BUN     (9-20)  mg/dL


 


Glucose     ()  mg/dL


 


POC Glucose  160 H  339 H   ()  mg/dL














  10/01/21 10/01/21 10/01/21 Range/Units





  04:47 07:27 12:11 


 


Hgb     (11.8-15.2)  gm/dl


 


Hct     (35.5-45.6)  %


 


MCV     (84-94)  fl


 


MCH     (28-32)  pg


 


MCHC     (32-34)  %


 


Plt Count     (140-440)  K/mm3


 


Mono % (Auto)     (0.0-7.3)  %


 


Mono # (Auto)     (0.0-0.8)  K/mm3


 


Seg Neutrophils %     (40.0-70.0)  %


 


Sodium  134 L    (137-145)  mmol/L


 


Chloride  97.4 L    ()  mmol/L


 


BUN  25 H    (9-20)  mg/dL


 


Glucose  264 H    ()  mg/dL


 


POC Glucose   223 H  238 H  ()  mg/dL

## 2021-10-01 NOTE — PROGRESS NOTE
Assessment and Plan


Assessment and plan: 





Right foot cellulitis/Right foot gas gangrene





Osteomyelitis of the fifth metatarsal and proximal phalanx of fifth toe


MRI confirms osteomyelitis of the fifth metatarsal and proximal phalanx of the 

fifth toe.





Sepsis.  Present on admission.  Etiology secondary to above.  Patient meets 

criteria given the tachycardia, leukocytosis and diagnosis of right foot 

cellulitis





GERARD


-Cr on admission 1.8





DM2


-Uncontrolled


-HgbA1c 11.8





HTN


-Monitor BP








9/29/2021. MRI confirms osteomyelitis of the fifth metatarsal and proximal 

phalanx of the fifth toe. Patient also noted to have tissue wound at the lateral

foot adjacent to the fifth metatarsal with gas in the subcutaneous tissues. 

Continue IV antibiotics of ceftriaxone, Flagyl and vancomycin per ID 

recommendations. Surgery consultation for further evaluation.





9/30/2021.  Patient seen in the Cath Lab.  Case discussed with Dr. Alexis.  

Patient with significant SFA disease and is currently undergoing 

revascularization of his right leg.  General surgery likely to perform 

amputation of his fifth toe.  Follow-up creatinine in a.m. after procedure.





10/1/2021.  General surgery to perform TMA of right fifth toe this morning.  

Patient received right lower extremity revascularization yesterday with vascular

surgery.  Continue antibiotics of ceftriaxone, Flagyl and vancomycin.  Follow-up

wound culture from surgery.  Creatinine has improved to 1.3.  Continue IV fluid 

hydration, avoid nephrotoxic agents and continue to monitor renal function.  

Continue Lantus and SSRI for DM.  Continue heparin and Pepcid for DVT and GI 

prophylaxis





History


Interval history: 





No new issues overnight.  Patient seen in the Cath Lab.  Case discussed with Dr. Alexis





Hospitalist Physical





- Constitutional


Vitals: 


                                        











Temp Pulse Resp BP Pulse Ox


 


 98.4 F   95 H  18   134/78   100 


 


 10/01/21 03:22  10/01/21 03:22  10/01/21 03:22  10/01/21 03:22  10/01/21 03:22











General appearance: Present: no acute distress





- EENT


Eyes: Present: PERRL, EOM intact


ENT: hearing intact, clear oral mucosa, dentition normal





- Neck


Neck: Present: supple, normal ROM





- Respiratory


Respiratory effort: normal


Respiratory: bilateral: CTA





- Cardiovascular


Rhythm: regular


Heart Sounds: Present: S1 & S2.  Absent: gallop, rub





- Extremities


Extremities: no ischemia, No edema, Full ROM





- Abdominal


General gastrointestinal: soft, non-tender, non-distended, normal bowel sounds





- Integumentary


Integumentary: Present: clear, warm, dry





- Neurologic


Neurologic: CNII-XII intact, moves all extremities





Results





- Labs


CBC & Chem 7: 


                                 10/01/21 04:47





                                 10/01/21 04:47


Labs: 


                             Laboratory Last Values











WBC  10.3 K/mm3 (4.5-11.0)   10/01/21  04:47    


 


RBC  3.67 M/mm3 (3.65-5.03)   10/01/21  04:47    


 


Hgb  9.9 gm/dl (11.8-15.2)  L  10/01/21  04:47    


 


Hct  28.4 % (35.5-45.6)  L  10/01/21  04:47    


 


MCV  77 fl (84-94)  L  10/01/21  04:47    


 


MCH  27 pg (28-32)  L  10/01/21  04:47    


 


MCHC  35 % (32-34)  H  10/01/21  04:47    


 


RDW  14.0 % (13.2-15.2)   10/01/21  04:47    


 


Plt Count  446 K/mm3 (140-440)  H  10/01/21  04:47    


 


Lymph % (Auto)  18.7 % (13.4-35.0)   10/01/21  04:47    


 


Mono % (Auto)  8.4 % (0.0-7.3)  H  10/01/21  04:47    


 


Eos % (Auto)  1.4 % (0.0-4.3)   10/01/21  04:47    


 


Baso % (Auto)  0.8 % (0.0-1.8)   10/01/21  04:47    


 


Lymph # (Auto)  1.9 K/mm3 (1.2-5.4)   10/01/21  04:47    


 


Mono # (Auto)  0.9 K/mm3 (0.0-0.8)  H  10/01/21  04:47    


 


Eos # (Auto)  0.1 K/mm3 (0.0-0.4)   10/01/21  04:47    


 


Baso # (Auto)  0.1 K/mm3 (0.0-0.1)   10/01/21  04:47    


 


Seg Neutrophils %  70.7 % (40.0-70.0)  H  10/01/21  04:47    


 


Seg Neutrophils #  7.3 K/mm3 (1.8-7.7)   10/01/21  04:47    


 


Sodium  134 mmol/L (137-145)  L  10/01/21  04:47    


 


Potassium  4.7 mmol/L (3.6-5.0)   10/01/21  04:47    


 


Chloride  97.4 mmol/L ()  L  10/01/21  04:47    


 


Carbon Dioxide  30 mmol/L (22-30)   10/01/21  04:47    


 


Anion Gap  11 mmol/L  10/01/21  04:47    


 


BUN  25 mg/dL (9-20)  H  10/01/21  04:47    


 


Creatinine  1.3 mg/dL (0.8-1.3)   10/01/21  04:47    


 


Estimated GFR  > 60 ml/min  10/01/21  04:47    


 


BUN/Creatinine Ratio  19 %  10/01/21  04:47    


 


Glucose  264 mg/dL ()  H  10/01/21  04:47    


 


POC Glucose  223 mg/dL ()  H  10/01/21  07:27    


 


Hemoglobin A1c  11.8 % (4-6)  H  09/27/21  23:17    


 


Lactic Acid  1.80 mmol/L (0.7-2.0)   09/27/21  23:17    


 


Calcium  9.3 mg/dL (8.4-10.2)   10/01/21  04:47    


 


Total Bilirubin  0.40 mg/dL (0.1-1.2)   09/27/21  23:17    


 


Direct Bilirubin  < 0.2 mg/dL (0-0.2)   09/27/21  23:17    


 


Indirect Bilirubin  0.2 mg/dL  09/27/21  23:17    


 


AST  11 units/L (5-40)   09/27/21  23:17    


 


ALT  11 units/L (7-56)   09/27/21  23:17    


 


Alkaline Phosphatase  107 units/L ()   09/27/21  23:17    


 


Total Protein  8.6 g/dL (6.3-8.2)  H  09/27/21  23:17    


 


Albumin  4.0 g/dL (3.9-5)   09/27/21  23:17    


 


Albumin/Globulin Ratio  0.9 %  09/27/21  23:17    











Microbiology: 


Microbiology





09/27/21 23:17   Peripheral/Venous   Blood Culture - Preliminary


                            NO GROWTH AFTER 72 HOURS


09/27/21 23:13   Peripheral/Venous   Blood Culture - Preliminary


                            NO GROWTH AFTER 72 HOURS








Orr/IV: 


                                        





Voiding Method                   Urinal











Active Medications





- Current Medications


Current Medications: 














Generic Name Dose Route Start Last Admin





  Trade Name Freq  PRN Reason Stop Dose Admin


 


Acetaminophen  650 mg  09/28/21 00:25 





  Acetaminophen 325 Mg Tab  PO  





  Q4H PRN  





  Pain MILD(1-3)/Fever >100.5/HA  


 


Amlodipine Besylate  10 mg  09/28/21 10:00  09/30/21 11:57





  Amlodipine 10 Mg Tab  PO   10 mg





  DAILY PAULY   Administration


 


Aspirin  81 mg  09/30/21 16:00  09/30/21 17:01





  Aspirin 81 Mg Tab Chew  PO   81 mg





  QDAY PAULY   Administration


 


Clopidogrel Bisulfate  75 mg  09/30/21 16:00  09/30/21 17:01





  Clopidogrel 75 Mg Tab  PO   75 mg





  QDAY PAULY   Administration


 


Dextrose  50 ml  09/28/21 00:25 





  Dextrose 50% In Water (25gm) 50 Ml Syringe  IV  





  Q30MIN PRN  





  Hypoglycemia  





  Protocol  


 


Docusate Sodium  100 mg  09/28/21 10:00  09/30/21 21:37





  Docusate Sodium 100 Mg Cap  PO   100 mg





  BID PAULY   Administration


 


Famotidine  10 mg  09/28/21 10:00  09/30/21 21:37





  Famotidine 20 Mg/2 Ml Inj  IV   10 mg





  BID PAULY   Administration


 


Gabapentin  100 mg  09/28/21 10:00  09/30/21 11:57





  Gabapentin 100 Mg Cap  PO   Not Given





  DAILY PAULY  


 


Heparin Sodium (Porcine)  5,000 unit  09/28/21 10:00  09/30/21 21:37





  Heparin 5,000 Unit/1 Ml Vial  SUB-Q   5,000 unit





  Q12HR PAULY   Administration


 


Vancomycin HCl 1,500 mg/  530 mls @ 333.333 mls/hr  09/29/21 03:00  10/01/21 

02:23





  Sodium Chloride  IV   333.333 mls/hr





  Q24H PAULY   Administration


 


Ceftriaxone Sodium  2 gm in 100 mls @ 200 mls/hr  09/28/21 14:00  09/30/21 11:53





  Rocephin/Ns 2 Gm/100 Ml  IV   200 mls/hr





  Q24HR PAULY   Administration





  Protocol  


 


Metronidazole  500 mg in 100 mls @ 100 mls/hr  09/28/21 14:00  10/01/21 05:10





  Flagyl 500 Mg/100 Ml  IV   100 mls/hr





  Q8H PAULY   Administration





  Protocol  


 


Insulin Glargine  15 units  09/28/21 08:00  09/30/21 11:09





  Insulin Glargine 100 Units/Ml  SUB-Q   Not Given





  QAMDIAB [facility]  


 


Insulin Human Lispro  0 unit  09/28/21 07:30  09/30/21 21:39





  Insulin Lispro 100 Unit/Ml  SUB-Q   6 unit





  [facility]   Administration





  Protocol  


 


Morphine Sulfate  4 mg  09/28/21 00:27 





  Morphine 4 Mg/1 Ml Inj  IV  





  Q4H PRN  





  Pain , Severe (7-10)  


 


Naloxone HCl  0.1 mg  09/28/21 00:27 





  Naloxone 0.4 Mg/1 Ml Inj  IV  





  Q2MIN PRN  





  Res Rate </= 8 or 02 SAT < 92%  


 


Ondansetron HCl  4 mg  09/28/21 00:25 





  Ondansetron 4 Mg/2 Ml Inj  IV  





  Q6H PRN  





  Nausea And Vomiting  


 


Oxycodone/Acetaminophen  1 tab  09/28/21 00:27 





  Oxycodone /Acetaminophen 5-325mg Tab  PO  





  Q6H PRN  





  Pain, Moderate (4-6)  


 


Pravastatin Sodium  40 mg  09/28/21 22:00  09/30/21 21:37





  Pravastatin 40 Mg Tab  PO   40 mg





  [facility]   Administration


 


Sodium Chloride  10 ml  09/28/21 10:00  09/30/21 21:38





  Sodium Chloride 0.9% 10 Ml Flush Syringe  IV   10 ml





  [facility]   Administration


 


Sodium Chloride  10 ml  09/28/21 00:25 





  Sodium Chloride 0.9% 10 Ml Flush Syringe  IV  





  PRN PRN  





  LINE FLUSH

## 2021-10-01 NOTE — PROCEDURE NOTE
Date of procedure: 10/01/21


Pre-op diagnosis: Osteomyelitis of right 5th toe and metatarsal head


Post-op diagnosis: same


Procedure: 





Right 5th TMA





Description of procedure:  Pt was placed supine on the OR table.  General 

anesthesia was administered.  Right foot was prepped and draped.  A tear drop 

incision was made about the 5th toe and the toe amputated at the MTP joint.  

Periosteum was elevated off of the distal metatarsal shaft and the shaft 

amputated mid-way with the bone saw.  Necrotic soft tissue was sharply debrided.

 Wound was irrigated with warm saline.  Wound was packed open with a dilute 

Betadine moistened Kerlix roll followed by dry 4 X 4's, Kerlix wrap and Coban w

rap.  Pt tolerated the procedure well and was taken to PACU in stable condition.




Anesthesia: JOSE F


Surgeon: ROSLYN VILLANUEVA


Estimated blood loss: minimal


Pathology: list (1) Right 5th toe 2) Right 5th metatarsal head)


Specimen disposition: to lab


Condition: stable


Disposition: PACU

## 2021-10-02 LAB
BASOPHILS # (AUTO): 0.1 K/MM3 (ref 0–0.1)
BASOPHILS NFR BLD AUTO: 0.7 % (ref 0–1.8)
BUN SERPL-MCNC: 27 MG/DL (ref 9–20)
BUN/CREAT SERPL: 19 %
CALCIUM SERPL-MCNC: 8.9 MG/DL (ref 8.4–10.2)
EOSINOPHIL # BLD AUTO: 0.1 K/MM3 (ref 0–0.4)
EOSINOPHIL NFR BLD AUTO: 0.5 % (ref 0–4.3)
HCT VFR BLD CALC: 29.1 % (ref 35.5–45.6)
HEMOLYSIS INDEX: 10
HGB BLD-MCNC: 9.6 GM/DL (ref 11.8–15.2)
LYMPHOCYTES # BLD AUTO: 1.5 K/MM3 (ref 1.2–5.4)
LYMPHOCYTES NFR BLD AUTO: 12.6 % (ref 13.4–35)
MCHC RBC AUTO-ENTMCNC: 33 % (ref 32–34)
MCV RBC AUTO: 80 FL (ref 84–94)
MONOCYTES # (AUTO): 1 K/MM3 (ref 0–0.8)
MONOCYTES % (AUTO): 8.1 % (ref 0–7.3)
PLATELET # BLD: 433 K/MM3 (ref 140–440)
RBC # BLD AUTO: 3.65 M/MM3 (ref 3.65–5.03)

## 2021-10-02 RX ADMIN — INSULIN GLARGINE SCH UNITS: 100 INJECTION, SOLUTION SUBCUTANEOUS at 09:49

## 2021-10-02 RX ADMIN — VANCOMYCIN HYDROCHLORIDE SCH MLS/HR: 5 INJECTION, POWDER, LYOPHILIZED, FOR SOLUTION INTRAVENOUS at 17:15

## 2021-10-02 RX ADMIN — Medication SCH ML: at 09:50

## 2021-10-02 RX ADMIN — GABAPENTIN SCH MG: 100 CAPSULE ORAL at 09:49

## 2021-10-02 RX ADMIN — HEPARIN SODIUM SCH UNIT: 5000 INJECTION, SOLUTION INTRAVENOUS; SUBCUTANEOUS at 09:32

## 2021-10-02 RX ADMIN — PRAVASTATIN SODIUM SCH MG: 40 TABLET ORAL at 21:18

## 2021-10-02 RX ADMIN — CLOPIDOGREL BISULFATE SCH MG: 75 TABLET ORAL at 09:49

## 2021-10-02 RX ADMIN — FAMOTIDINE SCH MG: 10 INJECTION, SOLUTION INTRAVENOUS at 09:48

## 2021-10-02 RX ADMIN — HEPARIN SODIUM SCH UNIT: 5000 INJECTION, SOLUTION INTRAVENOUS; SUBCUTANEOUS at 21:20

## 2021-10-02 RX ADMIN — Medication SCH ML: at 21:18

## 2021-10-02 RX ADMIN — VANCOMYCIN HYDROCHLORIDE SCH MLS/HR: 5 INJECTION, POWDER, LYOPHILIZED, FOR SOLUTION INTRAVENOUS at 03:45

## 2021-10-02 RX ADMIN — INSULIN LISPRO SCH UNIT: 100 INJECTION, SOLUTION INTRAVENOUS; SUBCUTANEOUS at 12:32

## 2021-10-02 RX ADMIN — METRONIDAZOLE SCH MLS/HR: 5 INJECTION, SOLUTION INTRAVENOUS at 06:05

## 2021-10-02 RX ADMIN — METRONIDAZOLE SCH MLS/HR: 5 INJECTION, SOLUTION INTRAVENOUS at 15:35

## 2021-10-02 RX ADMIN — INSULIN LISPRO SCH UNIT: 100 INJECTION, SOLUTION INTRAVENOUS; SUBCUTANEOUS at 09:49

## 2021-10-02 RX ADMIN — DOCUSATE SODIUM SCH MG: 100 CAPSULE, LIQUID FILLED ORAL at 09:49

## 2021-10-02 RX ADMIN — DOCUSATE SODIUM SCH MG: 100 CAPSULE, LIQUID FILLED ORAL at 21:18

## 2021-10-02 RX ADMIN — METRONIDAZOLE SCH MLS/HR: 5 INJECTION, SOLUTION INTRAVENOUS at 21:18

## 2021-10-02 RX ADMIN — INSULIN LISPRO SCH UNIT: 100 INJECTION, SOLUTION INTRAVENOUS; SUBCUTANEOUS at 17:15

## 2021-10-02 RX ADMIN — ASPIRIN SCH MG: 81 TABLET, CHEWABLE ORAL at 09:48

## 2021-10-02 RX ADMIN — FAMOTIDINE SCH MG: 10 TABLET ORAL at 21:18

## 2021-10-02 RX ADMIN — INSULIN LISPRO SCH UNIT: 100 INJECTION, SOLUTION INTRAVENOUS; SUBCUTANEOUS at 21:55

## 2021-10-02 RX ADMIN — CEFTRIAXONE SODIUM SCH MLS/HR: 2 INJECTION, POWDER, FOR SOLUTION INTRAMUSCULAR; INTRAVENOUS at 09:48

## 2021-10-02 NOTE — PROGRESS NOTE
Assessment and Plan


Assessment and plan: 





Right foot cellulitis/Right foot gas gangrene





Osteomyelitis of the fifth metatarsal and proximal phalanx of fifth toe


MRI confirms osteomyelitis of the fifth metatarsal and proximal phalanx of the 

fifth toe.





Sepsis.  Present on admission.  Etiology secondary to above.  Patient meets 

criteria given the tachycardia, leukocytosis and diagnosis of right foot 

cellulitis





GERARD


-Cr on admission 1.8





DM2


-Uncontrolled


-HgbA1c 11.8





HTN


-Monitor BP








9/29/2021. MRI confirms osteomyelitis of the fifth metatarsal and proximal 

phalanx of the fifth toe. Patient also noted to have tissue wound at the lateral

foot adjacent to the fifth metatarsal with gas in the subcutaneous tissues. 

Continue IV antibiotics of ceftriaxone, Flagyl and vancomycin per ID 

recommendations. Surgery consultation for further evaluation.





9/30/2021.  Patient seen in the Cath Lab.  Case discussed with Dr. Alexis.  

Patient with significant SFA disease and is currently undergoing 

revascularization of his right leg.  General surgery likely to perform 

amputation of his fifth toe.  Follow-up creatinine in a.m. after procedure.





10/1/2021.  General surgery to perform TMA of right fifth toe this morning.  

Patient received right lower extremity revascularization yesterday with vascular

surgery.  Continue antibiotics of ceftriaxone, Flagyl and vancomycin.  Follow-up

wound culture from surgery.  Creatinine has improved to 1.3.  Continue IV fluid 

hydration, avoid nephrotoxic agents and continue to monitor renal function.  

Continue Lantus and SSRI for DM.  Continue heparin and Pepcid for DVT and GI 

prophylaxis





10/2/2021.  Patient with right fifth TMA yesterday.  Continue antibiotics per 

ID--Await recommendations for discharge.  Creatinine remains stable at 1.4.  

Continue Lantus and SSRI for DM.  Continue heparin and Pepcid for DVT and GI 

prophylaxis.  PT evaluation





History


Interval history: 





No new issues overnight.  





Hospitalist Physical





- Constitutional


Vitals: 


                                        











Temp Pulse Resp BP Pulse Ox


 


 99.1 F   98 H  20   112/80   94 


 


 10/02/21 05:53  10/02/21 05:53  10/02/21 05:53  10/02/21 05:53  10/02/21 05:53











General appearance: Present: no acute distress





- EENT


Eyes: Present: PERRL, EOM intact


ENT: hearing intact, clear oral mucosa, dentition normal





- Neck


Neck: Present: supple, normal ROM





- Respiratory


Respiratory effort: normal


Respiratory: bilateral: CTA





- Cardiovascular


Rhythm: regular


Heart Sounds: Present: S1 & S2.  Absent: gallop, rub





- Extremities


Extremities: no ischemia, No edema, Full ROM





- Abdominal


General gastrointestinal: soft, non-tender, non-distended, normal bowel sounds





- Integumentary


Integumentary: Present: clear, warm, dry





- Neurologic


Neurologic: CNII-XII intact, moves all extremities





Results





- Labs


CBC & Chem 7: 


                                 10/02/21 02:17





                                 10/02/21 02:17


Labs: 


                             Laboratory Last Values











WBC  11.8 K/mm3 (4.5-11.0)  H  10/02/21  02:17    


 


RBC  3.65 M/mm3 (3.65-5.03)   10/02/21  02:17    


 


Hgb  9.6 gm/dl (11.8-15.2)  L  10/02/21  02:17    


 


Hct  29.1 % (35.5-45.6)  L  10/02/21  02:17    


 


MCV  80 fl (84-94)  L  10/02/21  02:17    


 


MCH  26 pg (28-32)  L  10/02/21  02:17    


 


MCHC  33 % (32-34)   10/02/21  02:17    


 


RDW  14.6 % (13.2-15.2)   10/02/21  02:17    


 


Plt Count  433 K/mm3 (140-440)   10/02/21  02:17    


 


Lymph % (Auto)  12.6 % (13.4-35.0)  L  10/02/21  02:17    


 


Mono % (Auto)  8.1 % (0.0-7.3)  H  10/02/21  02:17    


 


Eos % (Auto)  0.5 % (0.0-4.3)   10/02/21  02:17    


 


Baso % (Auto)  0.7 % (0.0-1.8)   10/02/21  02:17    


 


Lymph # (Auto)  1.5 K/mm3 (1.2-5.4)   10/02/21  02:17    


 


Mono # (Auto)  1.0 K/mm3 (0.0-0.8)  H  10/02/21  02:17    


 


Eos # (Auto)  0.1 K/mm3 (0.0-0.4)   10/02/21  02:17    


 


Baso # (Auto)  0.1 K/mm3 (0.0-0.1)   10/02/21  02:17    


 


Seg Neutrophils %  78.1 % (40.0-70.0)  H  10/02/21  02:17    


 


Seg Neutrophils #  9.2 K/mm3 (1.8-7.7)  H  10/02/21  02:17    


 


Sodium  135 mmol/L (137-145)  L  10/02/21  02:17    


 


Potassium  4.9 mmol/L (3.6-5.0)   10/02/21  02:17    


 


Chloride  98.9 mmol/L ()   10/02/21  02:17    


 


Carbon Dioxide  20 mmol/L (22-30)  L D 10/02/21  02:17    


 


Anion Gap  21 mmol/L  10/02/21  02:17    


 


BUN  27 mg/dL (9-20)  H  10/02/21  02:17    


 


Creatinine  1.4 mg/dL (0.8-1.3)  H  10/02/21  02:17    


 


Estimated GFR  > 60 ml/min  10/02/21  02:17    


 


BUN/Creatinine Ratio  19 %  10/02/21  02:17    


 


Glucose  300 mg/dL ()  H  10/02/21  02:17    


 


POC Glucose  207 mg/dL ()  H  10/02/21  08:02    


 


Hemoglobin A1c  11.8 % (4-6)  H  09/27/21  23:17    


 


Lactic Acid  1.80 mmol/L (0.7-2.0)   09/27/21  23:17    


 


Calcium  8.9 mg/dL (8.4-10.2)   10/02/21  02:17    


 


Total Bilirubin  0.40 mg/dL (0.1-1.2)   09/27/21  23:17    


 


Direct Bilirubin  < 0.2 mg/dL (0-0.2)   09/27/21  23:17    


 


Indirect Bilirubin  0.2 mg/dL  09/27/21  23:17    


 


AST  11 units/L (5-40)   09/27/21  23:17    


 


ALT  11 units/L (7-56)   09/27/21  23:17    


 


Alkaline Phosphatase  107 units/L ()   09/27/21  23:17    


 


Total Protein  8.6 g/dL (6.3-8.2)  H  09/27/21  23:17    


 


Albumin  4.0 g/dL (3.9-5)   09/27/21  23:17    


 


Albumin/Globulin Ratio  0.9 %  09/27/21  23:17    


 


Vancomycin Trough  6.6 ug/mL (5.0-20.0)   10/02/21  02:17    











Microbiology: 


Microbiology





09/27/21 23:17   Peripheral/Venous   Blood Culture - Preliminary


                            NO GROWTH AFTER 4 DAYS


09/27/21 23:13   Peripheral/Venous   Blood Culture - Preliminary


                            NO GROWTH AFTER 4 DAYS








Orr/IV: 


                                        





Voiding Method                   Toilet











Active Medications





- Current Medications


Current Medications: 














Generic Name Dose Route Start Last Admin





  Trade Name Freq  PRN Reason Stop Dose Admin


 


Acetaminophen  650 mg  09/28/21 00:25 





  Acetaminophen 325 Mg Tab  PO  





  Q4H PRN  





  Pain MILD(1-3)/Fever >100.5/HA  


 


Amlodipine Besylate  10 mg  09/28/21 10:00  10/01/21 11:00





  Amlodipine 10 Mg Tab  PO   10 mg





  DAILY PAULY   Administration


 


Aspirin  81 mg  09/30/21 16:00  10/01/21 11:13





  Aspirin 81 Mg Tab Chew  PO   Not Given





  QDAY PAULY  


 


Clopidogrel Bisulfate  75 mg  09/30/21 16:00  10/01/21 11:00





  Clopidogrel 75 Mg Tab  PO   75 mg





  QDAY PAULY   Administration


 


Dextrose  50 ml  09/28/21 00:25 





  Dextrose 50% In Water (25gm) 50 Ml Syringe  IV  





  Q30MIN PRN  





  Hypoglycemia  





  Protocol  


 


Docusate Sodium  100 mg  09/28/21 10:00  10/01/21 22:45





  Docusate Sodium 100 Mg Cap  PO   100 mg





  BID PAULY   Administration


 


Famotidine  10 mg  09/28/21 10:00  10/01/21 22:45





  Famotidine 20 Mg/2 Ml Inj  IV   10 mg





  BID PAULY   Administration


 


Gabapentin  100 mg  09/28/21 10:00  10/01/21 11:00





  Gabapentin 100 Mg Cap  PO   100 mg





  DAILY PAULY   Administration


 


Heparin Sodium (Porcine)  5,000 unit  09/28/21 10:00  10/01/21 22:46





  Heparin 5,000 Unit/1 Ml Vial  SUB-Q   5,000 unit





  Q12HR PAULY   Administration


 


Ceftriaxone Sodium  2 gm in 100 mls @ 200 mls/hr  09/28/21 14:00  10/01/21 10:59





  Rocephin/Ns 2 Gm/100 Ml  IV   200 mls/hr





  Q24HR PAULY   Administration





  Protocol  


 


Metronidazole  500 mg in 100 mls @ 100 mls/hr  09/28/21 14:00  10/02/21 06:05





  Flagyl 500 Mg/100 Ml  IV   100 mls/hr





  Q8H [facility]   Administration





  Protocol  


 


Vancomycin HCl 1,250 mg/  275 mls @ 166.667 mls/hr  10/02/21 16:00 





  Sodium Chloride  IV  





  Q12H [facility]  


 


Insulin Glargine  15 units  09/28/21 08:00  10/01/21 08:20





  Insulin Glargine 100 Units/Ml  SUB-Q   Not Given





  QAMDIAB [facility]  


 


Insulin Human Lispro  0 unit  09/28/21 07:30  10/01/21 23:30





  Insulin Lispro 100 Unit/Ml  SUB-Q   8 unit





  ACHS [facility]   Administration





  Protocol  


 


Morphine Sulfate  4 mg  09/28/21 00:27 





  Morphine 4 Mg/1 Ml Inj  IV  





  Q4H PRN  





  Pain , Severe (7-10)  


 


Naloxone HCl  0.1 mg  09/28/21 00:27 





  Naloxone 0.4 Mg/1 Ml Inj  IV  





  Q2MIN PRN  





  Res Rate </= 8 or 02 SAT < 92%  


 


Ondansetron HCl  4 mg  09/28/21 00:25 





  Ondansetron 4 Mg/2 Ml Inj  IV  





  Q6H PRN  





  Nausea And Vomiting  


 


Oxycodone/Acetaminophen  1 tab  09/28/21 00:27 





  Oxycodone /Acetaminophen 5-325mg Tab  PO  





  Q6H PRN  





  Pain, Moderate (4-6)  


 


Pravastatin Sodium  40 mg  09/28/21 22:00  10/01/21 22:45





  Pravastatin 40 Mg Tab  PO   40 mg





  QHS PAULY   Administration


 


Sodium Chloride  10 ml  09/28/21 10:00  10/01/21 22:51





  Sodium Chloride 0.9% 10 Ml Flush Syringe  IV   10 ml





  BID PAULY   Administration


 


Sodium Chloride  10 ml  09/28/21 00:25 





  Sodium Chloride 0.9% 10 Ml Flush Syringe  IV  





  PRN PRN  





  LINE FLUSH

## 2021-10-02 NOTE — PROGRESS NOTE
Assessment and Plan





Patient doing well following his revascularization procedure.  Given the degree 

of his atherosclerotic disease, the patient will need to follow-up in our office

in 2 weeks following discharge.  He is okay to discharge home from a vascular 

standpoint.





Subjective


Date of service: 10/02/21


Principal diagnosis: PVD with gangrene


Interval history: 





Patient status post amputation with Dr. Chambers and postop revascularization of 

his right leg.  Patient is doing very well.  Complains of only minimal 

postsurgical normal pain.





Objective





- Constitutional


Vitals: 


                               Vital Signs - 12hr











  10/01/21 10/02/21 10/02/21





  22:00 00:19 05:53


 


Temperature  97.6 F 99.1 F


 


Pulse Rate 110 H 110 H 98 H


 


Pulse Rate [ 98 H  





Right Radial]   


 


Respiratory 18 18 20





Rate   


 


Blood Pressure  137/77 112/80


 


O2 Sat by Pulse 98 90 94





Oximetry   














  10/02/21 10/02/21





  07:55 09:05


 


Temperature 98.8 F 


 


Pulse Rate 93 H 


 


Pulse Rate [  98 H





Right Radial]  


 


Respiratory 18 18





Rate  


 


Blood Pressure 109/64 


 


O2 Sat by Pulse 93 98





Oximetry  











General appearance: Present: no acute distress





- EENT


Eyes: EOM intact


ENT: hearing intact





- Neck


Neck: supple, normal ROM





- Respiratory


Respiratory effort: normal


Extremity abnormal: other (Bandage in place right foot)





- Gastrointestinal


General gastrointestinal: Present: deferred


Rectal Exam: deferred





- Genitourinary


Male genitourinary: deferred





- Psychiatric


Psychiatric: appropriate mood/affect, cooperative





- Labs


CBC & Chem 7: 


                                 10/02/21 02:17





                                 10/02/21 02:17


Labs: 


                              Abnormal lab results











  10/01/21 10/01/21 10/01/21 Range/Units





  12:11 15:38 17:10 


 


WBC     (4.5-11.0)  K/mm3


 


Hgb     (11.8-15.2)  gm/dl


 


Hct     (35.5-45.6)  %


 


MCV     (84-94)  fl


 


MCH     (28-32)  pg


 


Lymph % (Auto)     (13.4-35.0)  %


 


Mono % (Auto)     (0.0-7.3)  %


 


Mono # (Auto)     (0.0-0.8)  K/mm3


 


Seg Neutrophils %     (40.0-70.0)  %


 


Seg Neutrophils #     (1.8-7.7)  K/mm3


 


Sodium     (137-145)  mmol/L


 


Carbon Dioxide     (22-30)  mmol/L


 


BUN     (9-20)  mg/dL


 


Creatinine     (0.8-1.3)  mg/dL


 


Glucose     ()  mg/dL


 


POC Glucose  238 H  202 H  237 H  ()  mg/dL














  10/01/21 10/02/21 10/02/21 Range/Units





  22:01 02:17 02:17 


 


WBC   11.8 H   (4.5-11.0)  K/mm3


 


Hgb   9.6 L   (11.8-15.2)  gm/dl


 


Hct   29.1 L   (35.5-45.6)  %


 


MCV   80 L   (84-94)  fl


 


MCH   26 L   (28-32)  pg


 


Lymph % (Auto)   12.6 L   (13.4-35.0)  %


 


Mono % (Auto)   8.1 H   (0.0-7.3)  %


 


Mono # (Auto)   1.0 H   (0.0-0.8)  K/mm3


 


Seg Neutrophils %   78.1 H   (40.0-70.0)  %


 


Seg Neutrophils #   9.2 H   (1.8-7.7)  K/mm3


 


Sodium    135 L  (137-145)  mmol/L


 


Carbon Dioxide    20 L D  (22-30)  mmol/L


 


BUN    27 H  (9-20)  mg/dL


 


Creatinine    1.4 H  (0.8-1.3)  mg/dL


 


Glucose    300 H  ()  mg/dL


 


POC Glucose  365 H    ()  mg/dL














  10/02/21 Range/Units





  08:02 


 


WBC   (4.5-11.0)  K/mm3


 


Hgb   (11.8-15.2)  gm/dl


 


Hct   (35.5-45.6)  %


 


MCV   (84-94)  fl


 


MCH   (28-32)  pg


 


Lymph % (Auto)   (13.4-35.0)  %


 


Mono % (Auto)   (0.0-7.3)  %


 


Mono # (Auto)   (0.0-0.8)  K/mm3


 


Seg Neutrophils %   (40.0-70.0)  %


 


Seg Neutrophils #   (1.8-7.7)  K/mm3


 


Sodium   (137-145)  mmol/L


 


Carbon Dioxide   (22-30)  mmol/L


 


BUN   (9-20)  mg/dL


 


Creatinine   (0.8-1.3)  mg/dL


 


Glucose   ()  mg/dL


 


POC Glucose  207 H  ()  mg/dL














Medications & Allergies





- Medications


Allergies/Adverse Reactions: 


                                    Allergies





No Known Allergies Allergy (Verified 09/27/21 22:32)


   








Home Medications: 


                                Home Medications











 Medication  Instructions  Recorded  Confirmed  Last Taken  Type


 


Amlodipine Besylate [Norvasc] 10 mg PO DAILY 03/06/15 09/29/21 03/05/15 History


 


Gabapentin 100 mg PO DAILY 03/06/15 09/29/21 03/05/15 History


 


Losartan/Hydrochlorothiazide 100 - 125 each PO DAILY 03/06/15 09/29/21 03/05/15 

History





[Hyzaar 100-25 TAB]     


 


Metformin HCl [Fortamet ER] 1,000 mg PO QDAY 03/06/15 09/29/21 03/05/15 History


 


Simvastatin 20 mg PO QHS 03/06/15 09/29/21 03/05/15 History


 


glipiZIDE [Glucotrol] 10 mg PO QDAY 03/06/15 09/29/21 03/05/15 History


 


Amoxicillin/K Clav Tab [Augmentin 1 each PO Q12H #10 day 03/09/15 09/29/21 

Unknown Rx





875MG TAB]     











Active Medications: 














Generic Name Dose Route Start Last Admin





  Trade Name Richieq  PRN Reason Stop Dose Admin


 


Acetaminophen  650 mg  09/28/21 00:25 





  Acetaminophen 325 Mg Tab  PO  





  Q4H PRN  





  Pain MILD(1-3)/Fever >100.5/HA  


 


Amlodipine Besylate  10 mg  09/28/21 10:00  10/01/21 11:00





  Amlodipine 10 Mg Tab  PO   10 mg





  DAILY PAULY   Administration


 


Aspirin  81 mg  09/30/21 16:00  10/01/21 11:13





  Aspirin 81 Mg Tab Chew  PO   Not Given





  QDAY PAULY  


 


Clopidogrel Bisulfate  75 mg  09/30/21 16:00  10/01/21 11:00





  Clopidogrel 75 Mg Tab  PO   75 mg





  QDAY PAULY   Administration


 


Dextrose  50 ml  09/28/21 00:25 





  Dextrose 50% In Water (25gm) 50 Ml Syringe  IV  





  Q30MIN PRN  





  Hypoglycemia  





  Protocol  


 


Docusate Sodium  100 mg  09/28/21 10:00  10/01/21 22:45





  Docusate Sodium 100 Mg Cap  PO   100 mg





  BID PAULY   Administration


 


Famotidine  10 mg  09/28/21 10:00  10/01/21 22:45





  Famotidine 20 Mg/2 Ml Inj  IV   10 mg





  BID PAULY   Administration


 


Gabapentin  100 mg  09/28/21 10:00  10/01/21 11:00





  Gabapentin 100 Mg Cap  PO   100 mg





  DAILY PAULY   Administration


 


Heparin Sodium (Porcine)  5,000 unit  09/28/21 10:00  10/01/21 22:46





  Heparin 5,000 Unit/1 Ml Vial  SUB-Q   5,000 unit





  Q12HR [facility]UL[facility]   Administration


 


Ceftriaxone Sodium  2 gm in 100 mls @ 200 mls/hr  09/28/21 14:00  10/01/21 10:59





  Rocephin/Ns 2 Gm/100 Ml  IV   200 mls/hr





  Q24HR [facility]   Administration





  Protocol  


 


Metronidazole  500 mg in 100 mls @ 100 mls/hr  09/28/21 14:00  10/02/21 06:05





  Flagyl 500 Mg/100 Ml  IV   100 mls/hr





  Q8H [facility]   Administration





  Protocol  


 


Vancomycin HCl 1,250 mg/  275 mls @ 166.667 mls/hr  10/02/21 16:00 





  Sodium Chloride  IV  





  Q12H [facility]  


 


Insulin Glargine  15 units  09/28/21 08:00  10/01/21 08:20





  Insulin Glargine 100 Units/Ml  SUB-Q   Not Given





  QAMDIAB [facility]  


 


Insulin Human Lispro  0 unit  09/28/21 07:30  10/01/21 23:30





  Insulin Lispro 100 Unit/Ml  SUB-Q   8 unit





  ACHS [facility]   Administration





  Protocol  


 


Morphine Sulfate  4 mg  09/28/21 00:27 





  Morphine 4 Mg/1 Ml Inj  IV  





  Q4H PRN  





  Pain , Severe (7-10)  


 


Naloxone HCl  0.1 mg  09/28/21 00:27 





  Naloxone 0.4 Mg/1 Ml Inj  IV  





  Q2MIN PRN  





  Res Rate </= 8 or 02 SAT < 92%  


 


Ondansetron HCl  4 mg  09/28/21 00:25 





  Ondansetron 4 Mg/2 Ml Inj  IV  





  Q6H PRN  





  Nausea And Vomiting  


 


Oxycodone/Acetaminophen  1 tab  09/28/21 00:27 





  Oxycodone /Acetaminophen 5-325mg Tab  PO  





  Q6H PRN  





  Pain, Moderate (4-6)  


 


Pravastatin Sodium  40 mg  09/28/21 22:00  10/01/21 22:45





  Pravastatin 40 Mg Tab  PO   40 mg





  QHS PAULY   Administration


 


Sodium Chloride  10 ml  09/28/21 10:00  10/01/21 22:51





  Sodium Chloride 0.9% 10 Ml Flush Syringe  IV   10 ml





  BID PAULY   Administration


 


Sodium Chloride  10 ml  09/28/21 00:25 





  Sodium Chloride 0.9% 10 Ml Flush Syringe  IV  





  PRN PRN  





  LINE FLUSH

## 2021-10-03 LAB
BASOPHILS # (AUTO): 0.1 K/MM3 (ref 0–0.1)
BASOPHILS NFR BLD AUTO: 0.6 % (ref 0–1.8)
BUN SERPL-MCNC: 19 MG/DL (ref 9–20)
BUN/CREAT SERPL: 16 %
CALCIUM SERPL-MCNC: 9.1 MG/DL (ref 8.4–10.2)
EOSINOPHIL # BLD AUTO: 0.2 K/MM3 (ref 0–0.4)
EOSINOPHIL NFR BLD AUTO: 1.8 % (ref 0–4.3)
HCT VFR BLD CALC: 27.6 % (ref 35.5–45.6)
HEMOLYSIS INDEX: 0
HGB BLD-MCNC: 9.2 GM/DL (ref 11.8–15.2)
LYMPHOCYTES # BLD AUTO: 2.3 K/MM3 (ref 1.2–5.4)
LYMPHOCYTES NFR BLD AUTO: 22 % (ref 13.4–35)
MCHC RBC AUTO-ENTMCNC: 33 % (ref 32–34)
MCV RBC AUTO: 79 FL (ref 84–94)
MONOCYTES # (AUTO): 1.1 K/MM3 (ref 0–0.8)
MONOCYTES % (AUTO): 10.3 % (ref 0–7.3)
PLATELET # BLD: 417 K/MM3 (ref 140–440)
RBC # BLD AUTO: 3.5 M/MM3 (ref 3.65–5.03)

## 2021-10-03 RX ADMIN — HEPARIN SODIUM SCH UNIT: 5000 INJECTION, SOLUTION INTRAVENOUS; SUBCUTANEOUS at 22:29

## 2021-10-03 RX ADMIN — METRONIDAZOLE SCH MLS/HR: 5 INJECTION, SOLUTION INTRAVENOUS at 05:40

## 2021-10-03 RX ADMIN — DOCUSATE SODIUM SCH MG: 100 CAPSULE, LIQUID FILLED ORAL at 09:22

## 2021-10-03 RX ADMIN — INSULIN LISPRO SCH UNIT: 100 INJECTION, SOLUTION INTRAVENOUS; SUBCUTANEOUS at 13:00

## 2021-10-03 RX ADMIN — METRONIDAZOLE SCH MLS/HR: 5 INJECTION, SOLUTION INTRAVENOUS at 16:26

## 2021-10-03 RX ADMIN — DOCUSATE SODIUM SCH MG: 100 CAPSULE, LIQUID FILLED ORAL at 22:29

## 2021-10-03 RX ADMIN — INSULIN LISPRO SCH UNIT: 100 INJECTION, SOLUTION INTRAVENOUS; SUBCUTANEOUS at 22:29

## 2021-10-03 RX ADMIN — FAMOTIDINE SCH MG: 10 TABLET ORAL at 22:29

## 2021-10-03 RX ADMIN — VANCOMYCIN HYDROCHLORIDE SCH MLS/HR: 5 INJECTION, POWDER, LYOPHILIZED, FOR SOLUTION INTRAVENOUS at 16:26

## 2021-10-03 RX ADMIN — CEFTRIAXONE SODIUM SCH MLS/HR: 2 INJECTION, POWDER, FOR SOLUTION INTRAMUSCULAR; INTRAVENOUS at 09:14

## 2021-10-03 RX ADMIN — GABAPENTIN SCH MG: 100 CAPSULE ORAL at 09:23

## 2021-10-03 RX ADMIN — VANCOMYCIN HYDROCHLORIDE SCH MLS/HR: 5 INJECTION, POWDER, LYOPHILIZED, FOR SOLUTION INTRAVENOUS at 03:55

## 2021-10-03 RX ADMIN — INSULIN LISPRO SCH UNIT: 100 INJECTION, SOLUTION INTRAVENOUS; SUBCUTANEOUS at 16:40

## 2021-10-03 RX ADMIN — Medication SCH ML: at 09:25

## 2021-10-03 RX ADMIN — ASPIRIN SCH MG: 81 TABLET, CHEWABLE ORAL at 09:22

## 2021-10-03 RX ADMIN — CLOPIDOGREL BISULFATE SCH MG: 75 TABLET ORAL at 09:21

## 2021-10-03 RX ADMIN — HEPARIN SODIUM SCH UNIT: 5000 INJECTION, SOLUTION INTRAVENOUS; SUBCUTANEOUS at 09:23

## 2021-10-03 RX ADMIN — INSULIN GLARGINE SCH UNITS: 100 INJECTION, SOLUTION SUBCUTANEOUS at 09:23

## 2021-10-03 RX ADMIN — FAMOTIDINE SCH MG: 10 TABLET ORAL at 09:21

## 2021-10-03 RX ADMIN — INSULIN LISPRO SCH UNIT: 100 INJECTION, SOLUTION INTRAVENOUS; SUBCUTANEOUS at 09:24

## 2021-10-03 RX ADMIN — Medication SCH ML: at 22:35

## 2021-10-03 RX ADMIN — PRAVASTATIN SODIUM SCH MG: 40 TABLET ORAL at 22:29

## 2021-10-03 RX ADMIN — METRONIDAZOLE SCH MLS/HR: 5 INJECTION, SOLUTION INTRAVENOUS at 22:35

## 2021-10-03 NOTE — PROGRESS NOTE
Assessment and Plan


Assessment and plan: 





Right foot cellulitis/Right foot gas gangrene





Osteomyelitis of the fifth metatarsal and proximal phalanx of fifth toe


MRI confirms osteomyelitis of the fifth metatarsal and proximal phalanx of the 

fifth toe.





Sepsis.  Present on admission.  Etiology secondary to above.  Patient meets 

criteria given the tachycardia, leukocytosis and diagnosis of right foot 

cellulitis





GERARD secondary to ATN from sepsis


-Cr on admission 1.8





DM2


-Uncontrolled


-HgbA1c 11.8





HTN


-Monitor BP








9/29/2021. MRI confirms osteomyelitis of the fifth metatarsal and proximal 

phalanx of the fifth toe. Patient also noted to have tissue wound at the lateral

foot adjacent to the fifth metatarsal with gas in the subcutaneous tissues. 

Continue IV antibiotics of ceftriaxone, Flagyl and vancomycin per ID 

recommendations. Surgery consultation for further evaluation.





9/30/2021.  Patient seen in the Cath Lab.  Case discussed with Dr. Alexis.  

Patient with significant SFA disease and is currently undergoing 

revascularization of his right leg.  General surgery likely to perform 

amputation of his fifth toe.  Follow-up creatinine in a.m. after procedure.





10/1/2021.  General surgery to perform TMA of right fifth toe this morning.  

Patient received right lower extremity revascularization yesterday with vascular

surgery.  Continue antibiotics of ceftriaxone, Flagyl and vancomycin.  Follow-up

wound culture from surgery.  Creatinine has improved to 1.3.  Continue IV fluid 

hydration, avoid nephrotoxic agents and continue to monitor renal function.  

Continue Lantus and SSRI for DM.  Continue heparin and Pepcid for DVT and GI p

rophylaxis





10/2/2021.  Patient with right fifth TMA yesterday.  Continue antibiotics per 

ID--Await recommendations for discharge.  Creatinine remains stable at 1.4.  

Continue Lantus and SSRI for DM.  Continue heparin and Pepcid for DVT and GI 

prophylaxis.  PT evaluation





10/3/2021.  Patient is doing well s/p TMA and revascularization procedure.  

Continue antibiotics per ID recommendations.  Creatinine has stabilized back to 

baseline of 1.2.  Await PT evaluation for discharge needs





History


Interval history: 





No new issues overnight.  





Hospitalist Physical





- Constitutional


Vitals: 


                                        











Temp Pulse Resp BP Pulse Ox


 


 98.6 F   102 H  18   116/78   91 


 


 10/03/21 07:49  10/03/21 09:22  10/03/21 07:49  10/03/21 09:22  10/03/21 07:49











General appearance: Present: no acute distress





- EENT


Eyes: Present: PERRL, EOM intact


ENT: hearing intact, clear oral mucosa, dentition normal





- Neck


Neck: Present: supple, normal ROM





- Respiratory


Respiratory effort: normal


Respiratory: bilateral: CTA





- Cardiovascular


Rhythm: regular


Heart Sounds: Present: S1 & S2.  Absent: gallop, rub





- Extremities


Extremities: no ischemia, No edema, Full ROM





- Abdominal


General gastrointestinal: soft, non-tender, non-distended, normal bowel sounds





- Integumentary


Integumentary: Present: clear, warm, dry





- Neurologic


Neurologic: CNII-XII intact, moves all extremities





Results





- Labs


CBC & Chem 7: 


                                 10/03/21 05:20





                                 10/03/21 05:20


Labs: 


                             Laboratory Last Values











WBC  10.4 K/mm3 (4.5-11.0)   10/03/21  05:20    


 


RBC  3.50 M/mm3 (3.65-5.03)  L  10/03/21  05:20    


 


Hgb  9.2 gm/dl (11.8-15.2)  L  10/03/21  05:20    


 


Hct  27.6 % (35.5-45.6)  L  10/03/21  05:20    


 


MCV  79 fl (84-94)  L  10/03/21  05:20    


 


MCH  26 pg (28-32)  L  10/03/21  05:20    


 


MCHC  33 % (32-34)   10/03/21  05:20    


 


RDW  14.4 % (13.2-15.2)   10/03/21  05:20    


 


Plt Count  417 K/mm3 (140-440)   10/03/21  05:20    


 


Lymph % (Auto)  22.0 % (13.4-35.0)   10/03/21  05:20    


 


Mono % (Auto)  10.3 % (0.0-7.3)  H  10/03/21  05:20    


 


Eos % (Auto)  1.8 % (0.0-4.3)   10/03/21  05:20    


 


Baso % (Auto)  0.6 % (0.0-1.8)   10/03/21  05:20    


 


Lymph # (Auto)  2.3 K/mm3 (1.2-5.4)   10/03/21  05:20    


 


Mono # (Auto)  1.1 K/mm3 (0.0-0.8)  H  10/03/21  05:20    


 


Eos # (Auto)  0.2 K/mm3 (0.0-0.4)   10/03/21  05:20    


 


Baso # (Auto)  0.1 K/mm3 (0.0-0.1)   10/03/21  05:20    


 


Seg Neutrophils %  65.3 % (40.0-70.0)   10/03/21  05:20    


 


Seg Neutrophils #  6.8 K/mm3 (1.8-7.7)   10/03/21  05:20    


 


Sodium  134 mmol/L (137-145)  L  10/03/21  05:20    


 


Potassium  4.4 mmol/L (3.6-5.0)   10/03/21  05:20    


 


Chloride  99.4 mmol/L ()   10/03/21  05:20    


 


Carbon Dioxide  25 mmol/L (22-30)   10/03/21  05:20    


 


Anion Gap  14 mmol/L  10/03/21  05:20    


 


BUN  19 mg/dL (9-20)   10/03/21  05:20    


 


Creatinine  1.2 mg/dL (0.8-1.3)   10/03/21  05:20    


 


Estimated GFR  > 60 ml/min  10/03/21  05:20    


 


BUN/Creatinine Ratio  16 %  10/03/21  05:20    


 


Glucose  204 mg/dL ()  H  10/03/21  05:20    


 


POC Glucose  181 mg/dL ()  H  10/03/21  07:48    


 


Hemoglobin A1c  11.8 % (4-6)  H  09/27/21  23:17    


 


Lactic Acid  1.80 mmol/L (0.7-2.0)   09/27/21  23:17    


 


Calcium  9.1 mg/dL (8.4-10.2)   10/03/21  05:20    


 


Total Bilirubin  0.40 mg/dL (0.1-1.2)   09/27/21  23:17    


 


Direct Bilirubin  < 0.2 mg/dL (0-0.2)   09/27/21  23:17    


 


Indirect Bilirubin  0.2 mg/dL  09/27/21  23:17    


 


AST  11 units/L (5-40)   09/27/21  23:17    


 


ALT  11 units/L (7-56)   09/27/21  23:17    


 


Alkaline Phosphatase  107 units/L ()   09/27/21  23:17    


 


Total Protein  8.6 g/dL (6.3-8.2)  H  09/27/21  23:17    


 


Albumin  4.0 g/dL (3.9-5)   09/27/21  23:17    


 


Albumin/Globulin Ratio  0.9 %  09/27/21  23:17    


 


Vancomycin Trough  6.6 ug/mL (5.0-20.0)   10/02/21  02:17    











Microbiology: 


Microbiology





09/27/21 23:17   Peripheral/Venous   Blood Culture - Final


                            NO GROWTH AFTER 5 DAYS


09/27/21 23:13   Peripheral/Venous   Blood Culture - Final


                            NO GROWTH AFTER 5 DAYS








Orr/IV: 


                                        





Voiding Method                   Urinal











Active Medications





- Current Medications


Current Medications: 














Generic Name Dose Route Start Last Admin





  Trade Name Freq  PRN Reason Stop Dose Admin


 


Acetaminophen  650 mg  09/28/21 00:25 





  Acetaminophen 325 Mg Tab  PO  





  Q4H PRN  





  Pain MILD(1-3)/Fever >100.5/HA  


 


Amlodipine Besylate  10 mg  09/28/21 10:00  10/03/21 09:22





  Amlodipine 10 Mg Tab  PO   10 mg





  DAILY PAULY   Administration


 


Aspirin  81 mg  09/30/21 16:00  10/03/21 09:22





  Aspirin 81 Mg Tab Chew  PO   81 mg





  QDAY PAULY   Administration


 


Clopidogrel Bisulfate  75 mg  09/30/21 16:00  10/03/21 09:21





  Clopidogrel 75 Mg Tab  PO   75 mg





  QDAY PAULY   Administration


 


Dextrose  50 ml  09/28/21 00:25 





  Dextrose 50% In Water (25gm) 50 Ml Syringe  IV  





  Q30MIN PRN  





  Hypoglycemia  





  Protocol  


 


Docusate Sodium  100 mg  09/28/21 10:00  10/03/21 09:22





  Docusate Sodium 100 Mg Cap  PO   100 mg





  BID PAULY   Administration


 


Famotidine  10 mg  10/02/21 22:00  10/03/21 09:21





  Famotidine 10 Mg Tab  PO   10 mg





  BID PAULY   Administration


 


Gabapentin  100 mg  09/28/21 10:00  10/03/21 09:23





  Gabapentin 100 Mg Cap  PO   100 mg





  DAILY PAULY   Administration


 


Heparin Sodium (Porcine)  5,000 unit  09/28/21 10:00  10/02/21 21:20





  Heparin 5,000 Unit/1 Ml Vial  SUB-Q   5,000 unit





  Q12HR PAULY   Administration


 


Ceftriaxone Sodium  2 gm in 100 mls @ 200 mls/hr  09/28/21 14:00  10/03/21 09:14





  Rocephin/Ns 2 Gm/100 Ml  IV   200 mls/hr





  Q24HR PAULY   Administration





  Protocol  


 


Metronidazole  500 mg in 100 mls @ 100 mls/hr  09/28/21 14:00  10/03/21 05:40





  Flagyl 500 Mg/100 Ml  IV   100 mls/hr





  Q8H PAULY   Administration





  Protocol  


 


Vancomycin HCl 1,250 mg/  275 mls @ 166.667 mls/hr  10/02/21 16:00  10/03/21 

03:55





  Sodium Chloride  IV   166.667 mls/hr





  Q12H PAULY   Administration


 


Insulin Glargine  15 units  09/28/21 08:00  10/02/21 09:49





  Insulin Glargine 100 Units/Ml  SUB-Q   15 units





  QAMDIAB PAULY   Administration


 


Insulin Human Lispro  0 unit  09/28/21 07:30  10/02/21 21:55





  Insulin Lispro 100 Unit/Ml  SUB-Q   4 unit





  ACHS PAULY   Administration





  Protocol  


 


Morphine Sulfate  4 mg  09/28/21 00:27 





  Morphine 4 Mg/1 Ml Inj  IV  





  Q4H PRN  





  Pain , Severe (7-10)  


 


Naloxone HCl  0.1 mg  09/28/21 00:27 





  Naloxone 0.4 Mg/1 Ml Inj  IV  





  Q2MIN PRN  





  Res Rate </= 8 or 02 SAT < 92%  


 


Ondansetron HCl  4 mg  09/28/21 00:25 





  Ondansetron 4 Mg/2 Ml Inj  IV  





  Q6H PRN  





  Nausea And Vomiting  


 


Oxycodone/Acetaminophen  1 tab  09/28/21 00:27 





  Oxycodone /Acetaminophen 5-325mg Tab  PO  





  Q6H PRN  





  Pain, Moderate (4-6)  


 


Pravastatin Sodium  40 mg  09/28/21 22:00  10/02/21 21:18





  Pravastatin 40 Mg Tab  PO   40 mg





  QHS PAULY   Administration


 


Sodium Chloride  10 ml  09/28/21 10:00  10/02/21 21:18





  Sodium Chloride 0.9% 10 Ml Flush Syringe  IV   10 ml





  BID PAULY   Administration


 


Sodium Chloride  10 ml  09/28/21 00:25 





  Sodium Chloride 0.9% 10 Ml Flush Syringe  IV  





  PRN PRN  





  LINE FLUSH  


 


Sodium Hypochlorite  1 applic  10/02/21 13:22 





  Sodium Hypochlorite, Dakin's Full Strength (0.5%) 473 Ml Topical Soln  TP  





  Q12H PRN  





  Wound Care

## 2021-10-04 LAB
BASOPHILS # (AUTO): 0.1 K/MM3 (ref 0–0.1)
BASOPHILS NFR BLD AUTO: 0.8 % (ref 0–1.8)
BUN SERPL-MCNC: 18 MG/DL (ref 9–20)
BUN/CREAT SERPL: 15 %
CALCIUM SERPL-MCNC: 9.2 MG/DL (ref 8.4–10.2)
EOSINOPHIL # BLD AUTO: 0.2 K/MM3 (ref 0–0.4)
EOSINOPHIL NFR BLD AUTO: 1.6 % (ref 0–4.3)
HCT VFR BLD CALC: 28.1 % (ref 35.5–45.6)
HEMOLYSIS INDEX: 0
HGB BLD-MCNC: 9.3 GM/DL (ref 11.8–15.2)
LYMPHOCYTES # BLD AUTO: 2.1 K/MM3 (ref 1.2–5.4)
LYMPHOCYTES NFR BLD AUTO: 21.4 % (ref 13.4–35)
MCHC RBC AUTO-ENTMCNC: 33 % (ref 32–34)
MCV RBC AUTO: 80 FL (ref 84–94)
MONOCYTES # (AUTO): 1 K/MM3 (ref 0–0.8)
MONOCYTES % (AUTO): 10.2 % (ref 0–7.3)
PLATELET # BLD: 446 K/MM3 (ref 140–440)
RBC # BLD AUTO: 3.52 M/MM3 (ref 3.65–5.03)

## 2021-10-04 RX ADMIN — INSULIN LISPRO SCH UNIT: 100 INJECTION, SOLUTION INTRAVENOUS; SUBCUTANEOUS at 08:38

## 2021-10-04 RX ADMIN — DOCUSATE SODIUM SCH MG: 100 CAPSULE, LIQUID FILLED ORAL at 10:05

## 2021-10-04 RX ADMIN — FAMOTIDINE SCH MG: 10 TABLET ORAL at 21:18

## 2021-10-04 RX ADMIN — INSULIN LISPRO SCH UNIT: 100 INJECTION, SOLUTION INTRAVENOUS; SUBCUTANEOUS at 21:18

## 2021-10-04 RX ADMIN — INSULIN LISPRO SCH UNIT: 100 INJECTION, SOLUTION INTRAVENOUS; SUBCUTANEOUS at 13:34

## 2021-10-04 RX ADMIN — INSULIN GLARGINE SCH UNITS: 100 INJECTION, SOLUTION SUBCUTANEOUS at 08:39

## 2021-10-04 RX ADMIN — GABAPENTIN SCH MG: 100 CAPSULE ORAL at 10:04

## 2021-10-04 RX ADMIN — Medication SCH ML: at 21:19

## 2021-10-04 RX ADMIN — PRAVASTATIN SODIUM SCH MG: 40 TABLET ORAL at 21:18

## 2021-10-04 RX ADMIN — FAMOTIDINE SCH MG: 10 TABLET ORAL at 10:04

## 2021-10-04 RX ADMIN — DOCUSATE SODIUM SCH MG: 100 CAPSULE, LIQUID FILLED ORAL at 21:18

## 2021-10-04 RX ADMIN — METRONIDAZOLE SCH MLS/HR: 5 INJECTION, SOLUTION INTRAVENOUS at 06:12

## 2021-10-04 RX ADMIN — HEPARIN SODIUM SCH UNIT: 5000 INJECTION, SOLUTION INTRAVENOUS; SUBCUTANEOUS at 21:18

## 2021-10-04 RX ADMIN — VANCOMYCIN HYDROCHLORIDE SCH MLS/HR: 5 INJECTION, POWDER, LYOPHILIZED, FOR SOLUTION INTRAVENOUS at 06:08

## 2021-10-04 RX ADMIN — METRONIDAZOLE SCH MLS/HR: 5 INJECTION, SOLUTION INTRAVENOUS at 21:17

## 2021-10-04 RX ADMIN — VANCOMYCIN HYDROCHLORIDE SCH MLS/HR: 5 INJECTION, POWDER, LYOPHILIZED, FOR SOLUTION INTRAVENOUS at 18:29

## 2021-10-04 RX ADMIN — METRONIDAZOLE SCH MLS/HR: 5 INJECTION, SOLUTION INTRAVENOUS at 13:33

## 2021-10-04 RX ADMIN — Medication SCH ML: at 10:05

## 2021-10-04 RX ADMIN — OXYCODONE AND ACETAMINOPHEN PRN TAB: 5; 325 TABLET ORAL at 08:41

## 2021-10-04 RX ADMIN — CLOPIDOGREL BISULFATE SCH MG: 75 TABLET ORAL at 10:04

## 2021-10-04 RX ADMIN — HEPARIN SODIUM SCH UNIT: 5000 INJECTION, SOLUTION INTRAVENOUS; SUBCUTANEOUS at 10:04

## 2021-10-04 RX ADMIN — CEFTRIAXONE SODIUM SCH MLS/HR: 2 INJECTION, POWDER, FOR SOLUTION INTRAMUSCULAR; INTRAVENOUS at 10:05

## 2021-10-04 RX ADMIN — ASPIRIN SCH MG: 81 TABLET, CHEWABLE ORAL at 10:04

## 2021-10-04 RX ADMIN — INSULIN LISPRO SCH UNIT: 100 INJECTION, SOLUTION INTRAVENOUS; SUBCUTANEOUS at 18:28

## 2021-10-04 NOTE — PROGRESS NOTE
Assessment and Plan





Cultures:


9/27/2021 blood culture: no growth





A/P:


56-year-old male with diabetes, hypertension admitted with:





#Sepsis, secondary to right diabetic foot infection with associated fifth toe 

osteomyelitis, gangrenous change: Follows with outpatient podiatry, failed 

outpatient therapy.  Non-smoker. 





#Diabetes uncontrolled





#GERARD: improved





#Peripheral vascular disease: noted on arterial US. Status post RLE 

revascularization 9/30/2021





Recs:


-continue Ceftriaxone, Flagyl and vancomycin


-Do not see any cultures pending results unfortunately. 


-s/p debridement and TMA of right fifth toe


-OK to DC on Bactrim DS and Augmentin 875/125mg q12h for 7 days


-Follow up in clinic to go over pathology in 1-2 weeks. 





YOANA Rivas MD


[facility], [place], operated by [facility] ([facility])


O: [phone]


F: [phone]





Subjective


Date of service: 10/04/21


Principal diagnosis: PVD with gangrene


Interval history: 





Afebrile, normal white count. Status post amputation.





Objective





- Exam


Narrative Exam: 








Constitutional: Alert, cooperative. No acute distress


Head, Ears, Nose: Normocephalic, atraumatic. External ears, nose normal


Eyes: Conjunctivae/corneas clear. No icterus. No ptosis.


Neck: Supple, no meningeal signs


Cardiovascular: S1, S2 +


Respiratory: Good air entry, clear to auscultation bilaterally


GI: Soft, non-tender; bowel sounds normal. No peritoneal signs


Musculoskeletal: Right foot in dressing


Skin: No rash or abscess


Hem/Lymphatic: No palpable cervical or supraclavicular nodes.


Psych: Mood ok. Affect normal


Neurological: Awake, alert, oriented. No gross abnormality    





- Constitutional


Vitals: 


                                   Vital Signs











Temp Pulse Resp BP Pulse Ox


 


 98.9 F   88   18   125/80   97 


 


 10/04/21 07:41  10/04/21 07:41  10/04/21 07:41  10/04/21 07:41  10/04/21 07:41








                           Temperature -Last 24 Hours











Temperature                    98.9 F


 


Temperature                    98.6 F


 


Temperature                    98.9 F


 


Temperature                    98.5 F


 


Temperature                    98.1 F

















- Labs


CBC & Chem 7: 


                                 10/04/21 04:53





                                 10/04/21 04:53


Labs: 


                              Abnormal lab results











  10/03/21 10/03/21 10/04/21 Range/Units





  16:27 20:35 04:53 


 


RBC    3.52 L  (3.65-5.03)  M/mm3


 


Hgb    9.3 L  (11.8-15.2)  gm/dl


 


Hct    28.1 L  (35.5-45.6)  %


 


MCV    80 L  (84-94)  fl


 


MCH    27 L  (28-32)  pg


 


Plt Count    446 H  (140-440)  K/mm3


 


Mono % (Auto)    10.2 H  (0.0-7.3)  %


 


Mono # (Auto)    1.0 H  (0.0-0.8)  K/mm3


 


Sodium     (137-145)  mmol/L


 


Glucose     ()  mg/dL


 


POC Glucose  322 H  325 H   ()  mg/dL














  10/04/21 10/04/21 10/04/21 Range/Units





  04:53 07:39 11:43 


 


RBC     (3.65-5.03)  M/mm3


 


Hgb     (11.8-15.2)  gm/dl


 


Hct     (35.5-45.6)  %


 


MCV     (84-94)  fl


 


MCH     (28-32)  pg


 


Plt Count     (140-440)  K/mm3


 


Mono % (Auto)     (0.0-7.3)  %


 


Mono # (Auto)     (0.0-0.8)  K/mm3


 


Sodium  134 L    (137-145)  mmol/L


 


Glucose  223 H    ()  mg/dL


 


POC Glucose   197 H  288 H  ()  mg/dL

## 2021-10-04 NOTE — PROGRESS NOTE
Assessment and Plan


Assessment and plan: 





Right foot cellulitis/Right foot gas gangrene





Osteomyelitis of the fifth metatarsal and proximal phalanx of fifth toe


MRI confirms osteomyelitis of the fifth metatarsal and proximal phalanx of the 

fifth toe.





Sepsis.  Present on admission.  Etiology secondary to above.  Patient meets 

criteria given the tachycardia, leukocytosis and diagnosis of right foot 

cellulitis





GERARD secondary to ATN from sepsis


-Cr on admission 1.8





DM2


-Uncontrolled


-HgbA1c 11.8





HTN


-Monitor BP








9/29/2021. MRI confirms osteomyelitis of the fifth metatarsal and proximal 

phalanx of the fifth toe. Patient also noted to have tissue wound at the lateral

foot adjacent to the fifth metatarsal with gas in the subcutaneous tissues. 

Continue IV antibiotics of ceftriaxone, Flagyl and vancomycin per ID 

recommendations. Surgery consultation for further evaluation.





9/30/2021.  Patient seen in the Cath Lab.  Case discussed with Dr. Alexis.  

Patient with significant SFA disease and is currently undergoing 

revascularization of his right leg.  General surgery likely to perform 

amputation of his fifth toe.  Follow-up creatinine in a.m. after procedure.





10/1/2021.  General surgery to perform TMA of right fifth toe this morning.  

Patient received right lower extremity revascularization yesterday with vascular

surgery.  Continue antibiotics of ceftriaxone, Flagyl and vancomycin.  Follow-up

wound culture from surgery.  Creatinine has improved to 1.3.  Continue IV fluid 

hydration, avoid nephrotoxic agents and continue to monitor renal function.  

Continue Lantus and SSRI for DM.  Continue heparin and Pepcid for DVT and GI p

rophylaxis





10/2/2021.  Patient with right fifth TMA yesterday.  Continue antibiotics per 

ID--Await recommendations for discharge.  Creatinine remains stable at 1.4.  

Continue Lantus and SSRI for DM.  Continue heparin and Pepcid for DVT and GI 

prophylaxis.  PT evaluation





10/3/2021.  Patient is doing well s/p TMA and revascularization procedure.  

Continue antibiotics per ID recommendations.  Creatinine has stabilized back to 

baseline of 1.2.  Await PT evaluation for discharge needs





10/4/2021.  Patient is doing well s/p TMA and revascularization procedure.  

Continue antibiotics per ID recommendations.  Creatinine has stabilized back to 

baseline of 1.2.  Physical therapy recommends discharge home with no needs.  

Await ID recommendations with regards to discharge antibiotics.





History


Interval history: 





No new issues overnight.  





Hospitalist Physical





- Constitutional


Vitals: 


                                        











Temp Pulse Resp BP Pulse Ox


 


 98.6 F   92 H  14   115/66   93 


 


 10/04/21 03:48  10/04/21 03:48  10/04/21 03:48  10/04/21 03:48  10/04/21 03:48











General appearance: Present: no acute distress





- EENT


Eyes: Present: PERRL, EOM intact


ENT: hearing intact, clear oral mucosa, dentition normal





- Neck


Neck: Present: supple, normal ROM





- Respiratory


Respiratory effort: normal


Respiratory: bilateral: CTA





- Cardiovascular


Rhythm: regular


Heart Sounds: Present: S1 & S2.  Absent: gallop, rub





- Extremities


Extremities: no ischemia, No edema, Full ROM





- Abdominal


General gastrointestinal: soft, non-tender, non-distended, normal bowel sounds





- Integumentary


Integumentary: Present: clear, warm, dry





- Neurologic


Neurologic: CNII-XII intact, moves all extremities





Results





- Labs


CBC & Chem 7: 


                                 10/04/21 04:53





                                 10/04/21 04:53


Labs: 


                             Laboratory Last Values











WBC  9.9 K/mm3 (4.5-11.0)   10/04/21  04:53    


 


RBC  3.52 M/mm3 (3.65-5.03)  L  10/04/21  04:53    


 


Hgb  9.3 gm/dl (11.8-15.2)  L  10/04/21  04:53    


 


Hct  28.1 % (35.5-45.6)  L  10/04/21  04:53    


 


MCV  80 fl (84-94)  L  10/04/21  04:53    


 


MCH  27 pg (28-32)  L  10/04/21  04:53    


 


MCHC  33 % (32-34)   10/04/21  04:53    


 


RDW  14.5 % (13.2-15.2)   10/04/21  04:53    


 


Plt Count  446 K/mm3 (140-440)  H  10/04/21  04:53    


 


Lymph % (Auto)  21.4 % (13.4-35.0)   10/04/21  04:53    


 


Mono % (Auto)  10.2 % (0.0-7.3)  H  10/04/21  04:53    


 


Eos % (Auto)  1.6 % (0.0-4.3)   10/04/21  04:53    


 


Baso % (Auto)  0.8 % (0.0-1.8)   10/04/21  04:53    


 


Lymph # (Auto)  2.1 K/mm3 (1.2-5.4)   10/04/21  04:53    


 


Mono # (Auto)  1.0 K/mm3 (0.0-0.8)  H  10/04/21  04:53    


 


Eos # (Auto)  0.2 K/mm3 (0.0-0.4)   10/04/21  04:53    


 


Baso # (Auto)  0.1 K/mm3 (0.0-0.1)   10/04/21  04:53    


 


Seg Neutrophils %  66.0 % (40.0-70.0)   10/04/21  04:53    


 


Seg Neutrophils #  6.5 K/mm3 (1.8-7.7)   10/04/21  04:53    


 


Sodium  134 mmol/L (137-145)  L  10/04/21  04:53    


 


Potassium  4.3 mmol/L (3.6-5.0)   10/04/21  04:53    


 


Chloride  99.1 mmol/L ()   10/04/21  04:53    


 


Carbon Dioxide  24 mmol/L (22-30)   10/04/21  04:53    


 


Anion Gap  15 mmol/L  10/04/21  04:53    


 


BUN  18 mg/dL (9-20)   10/04/21  04:53    


 


Creatinine  1.2 mg/dL (0.8-1.3)   10/04/21  04:53    


 


Estimated GFR  > 60 ml/min  10/04/21  04:53    


 


BUN/Creatinine Ratio  15 %  10/04/21  04:53    


 


Glucose  223 mg/dL ()  H  10/04/21  04:53    


 


POC Glucose  197 mg/dL ()  H  10/04/21  07:39    


 


Hemoglobin A1c  11.8 % (4-6)  H  09/27/21  23:17    


 


Lactic Acid  1.80 mmol/L (0.7-2.0)   09/27/21  23:17    


 


Calcium  9.2 mg/dL (8.4-10.2)   10/04/21  04:53    


 


Total Bilirubin  0.40 mg/dL (0.1-1.2)   09/27/21  23:17    


 


Direct Bilirubin  < 0.2 mg/dL (0-0.2)   09/27/21  23:17    


 


Indirect Bilirubin  0.2 mg/dL  09/27/21  23:17    


 


AST  11 units/L (5-40)   09/27/21  23:17    


 


ALT  11 units/L (7-56)   09/27/21  23:17    


 


Alkaline Phosphatase  107 units/L ()   09/27/21  23:17    


 


Total Protein  8.6 g/dL (6.3-8.2)  H  09/27/21  23:17    


 


Albumin  4.0 g/dL (3.9-5)   09/27/21  23:17    


 


Albumin/Globulin Ratio  0.9 %  09/27/21  23:17    


 


Vancomycin Trough  6.6 ug/mL (5.0-20.0)   10/02/21  02:17    











Orr/IV: 


                                        





Voiding Method                   Toilet











Active Medications





- Current Medications


Current Medications: 














Generic Name Dose Route Start Last Admin





  Trade Name Freq  PRN Reason Stop Dose Admin


 


Acetaminophen  650 mg  09/28/21 00:25 





  Acetaminophen 325 Mg Tab  PO  





  Q4H PRN  





  Pain MILD(1-3)/Fever >100.5/HA  


 


Amlodipine Besylate  10 mg  09/28/21 10:00  10/03/21 09:22





  Amlodipine 10 Mg Tab  PO   10 mg





  DAILY PAULY   Administration


 


Aspirin  81 mg  09/30/21 16:00  10/03/21 09:22





  Aspirin 81 Mg Tab Chew  PO   81 mg





  QDAY PAULY   Administration


 


Clopidogrel Bisulfate  75 mg  09/30/21 16:00  10/03/21 09:21





  Clopidogrel 75 Mg Tab  PO   75 mg





  QDAY PAULY   Administration


 


Dextrose  50 ml  09/28/21 00:25 





  Dextrose 50% In Water (25gm) 50 Ml Syringe  IV  





  Q30MIN PRN  





  Hypoglycemia  





  Protocol  


 


Docusate Sodium  100 mg  09/28/21 10:00  10/03/21 22:29





  Docusate Sodium 100 Mg Cap  PO   100 mg





  BID PAULY   Administration


 


Famotidine  10 mg  10/02/21 22:00  10/03/21 22:29





  Famotidine 10 Mg Tab  PO   10 mg





  BID PAULY   Administration


 


Gabapentin  100 mg  09/28/21 10:00  10/03/21 09:23





  Gabapentin 100 Mg Cap  PO   100 mg





  DAILY PAULY   Administration


 


Heparin Sodium (Porcine)  5,000 unit  09/28/21 10:00  10/03/21 22:29





  Heparin 5,000 Unit/1 Ml Vial  SUB-Q   5,000 unit





  Q12HR PAULY   Administration


 


Ceftriaxone Sodium  2 gm in 100 mls @ 200 mls/hr  09/28/21 14:00  10/03/21 09:14





  Rocephin/Ns 2 Gm/100 Ml  IV   200 mls/hr





  Q24HR PAULY   Administration





  Protocol  


 


Metronidazole  500 mg in 100 mls @ 100 mls/hr  09/28/21 14:00  10/04/21 06:12





  Flagyl 500 Mg/100 Ml  IV   100 mls/hr





  Q8H PAULY   Administration





  Protocol  


 


Vancomycin HCl 1,250 mg/  275 mls @ 166.667 mls/hr  10/02/21 16:00  10/04/21 

06:08





  Sodium Chloride  IV   166.667 mls/hr





  Q12H PAULY   Administration


 


Insulin Glargine  15 units  09/28/21 08:00  10/04/21 08:39





  Insulin Glargine 100 Units/Ml  SUB-Q   15 units





  QAMDIAB PAULY   Administration


 


Insulin Human Lispro  0 unit  09/28/21 07:30  10/04/21 08:38





  Insulin Lispro 100 Unit/Ml  SUB-Q   2 unit





  ACHS PAULY   Administration





  Protocol  


 


Morphine Sulfate  4 mg  09/28/21 00:27 





  Morphine 4 Mg/1 Ml Inj  IV  





  Q4H PRN  





  Pain , Severe (7-10)  


 


Naloxone HCl  0.1 mg  09/28/21 00:27 





  Naloxone 0.4 Mg/1 Ml Inj  IV  





  Q2MIN PRN  





  Res Rate </= 8 or 02 SAT < 92%  


 


Ondansetron HCl  4 mg  09/28/21 00:25 





  Ondansetron 4 Mg/2 Ml Inj  IV  





  Q6H PRN  





  Nausea And Vomiting  


 


Oxycodone/Acetaminophen  1 tab  09/28/21 00:27  10/04/21 08:41





  Oxycodone /Acetaminophen 5-325mg Tab  PO   1 tab





  Q6H PRN   Administration





  Pain, Moderate (4-6)  


 


Pravastatin Sodium  40 mg  09/28/21 22:00  10/03/21 22:29





  Pravastatin 40 Mg Tab  PO   40 mg





  [facility]   Administration


 


Sodium Chloride  10 ml  09/28/21 10:00  10/03/21 22:35





  Sodium Chloride 0.9% 10 Ml Flush Syringe  IV   10 ml





  BID PAULY   Administration


 


Sodium Chloride  10 ml  09/28/21 00:25 





  Sodium Chloride 0.9% 10 Ml Flush Syringe  IV  





  PRN PRN  





  LINE FLUSH  


 


Sodium Hypochlorite  1 applic  10/02/21 13:22 





  Sodium Hypochlorite, Dakin's Full Strength (0.5%) 473 Ml Topical Soln  TP  





  Q12H PRN  





  Wound Care

## 2021-10-05 VITALS — DIASTOLIC BLOOD PRESSURE: 85 MMHG | SYSTOLIC BLOOD PRESSURE: 134 MMHG

## 2021-10-05 LAB
BASOPHILS # (AUTO): 0.1 K/MM3 (ref 0–0.1)
BASOPHILS NFR BLD AUTO: 0.9 % (ref 0–1.8)
BUN SERPL-MCNC: 15 MG/DL (ref 9–20)
BUN/CREAT SERPL: 15 %
CALCIUM SERPL-MCNC: 9 MG/DL (ref 8.4–10.2)
EOSINOPHIL # BLD AUTO: 0.2 K/MM3 (ref 0–0.4)
EOSINOPHIL NFR BLD AUTO: 2.3 % (ref 0–4.3)
HCT VFR BLD CALC: 28.5 % (ref 35.5–45.6)
HEMOLYSIS INDEX: 0
HGB BLD-MCNC: 9.1 GM/DL (ref 11.8–15.2)
LYMPHOCYTES # BLD AUTO: 1.6 K/MM3 (ref 1.2–5.4)
LYMPHOCYTES NFR BLD AUTO: 18.1 % (ref 13.4–35)
MCHC RBC AUTO-ENTMCNC: 32 % (ref 32–34)
MCV RBC AUTO: 79 FL (ref 84–94)
MONOCYTES # (AUTO): 1 K/MM3 (ref 0–0.8)
MONOCYTES % (AUTO): 11.1 % (ref 0–7.3)
PLATELET # BLD: 474 K/MM3 (ref 140–440)
RBC # BLD AUTO: 3.59 M/MM3 (ref 3.65–5.03)

## 2021-10-05 RX ADMIN — CLOPIDOGREL BISULFATE SCH MG: 75 TABLET ORAL at 10:51

## 2021-10-05 RX ADMIN — GABAPENTIN SCH MG: 100 CAPSULE ORAL at 10:50

## 2021-10-05 RX ADMIN — ASPIRIN SCH MG: 81 TABLET, CHEWABLE ORAL at 10:50

## 2021-10-05 RX ADMIN — OXYCODONE AND ACETAMINOPHEN PRN TAB: 5; 325 TABLET ORAL at 10:50

## 2021-10-05 RX ADMIN — INSULIN GLARGINE SCH UNITS: 100 INJECTION, SOLUTION SUBCUTANEOUS at 10:50

## 2021-10-05 RX ADMIN — DOCUSATE SODIUM SCH MG: 100 CAPSULE, LIQUID FILLED ORAL at 10:50

## 2021-10-05 RX ADMIN — FAMOTIDINE SCH MG: 10 TABLET ORAL at 10:50

## 2021-10-05 RX ADMIN — Medication SCH ML: at 10:51

## 2021-10-05 RX ADMIN — CEFTRIAXONE SODIUM SCH MLS/HR: 2 INJECTION, POWDER, FOR SOLUTION INTRAMUSCULAR; INTRAVENOUS at 10:51

## 2021-10-05 RX ADMIN — VANCOMYCIN HYDROCHLORIDE SCH MLS/HR: 5 INJECTION, POWDER, LYOPHILIZED, FOR SOLUTION INTRAVENOUS at 03:24

## 2021-10-05 RX ADMIN — METRONIDAZOLE SCH MLS/HR: 5 INJECTION, SOLUTION INTRAVENOUS at 06:16

## 2021-10-05 RX ADMIN — HEPARIN SODIUM SCH UNIT: 5000 INJECTION, SOLUTION INTRAVENOUS; SUBCUTANEOUS at 10:50

## 2021-10-05 NOTE — PROGRESS NOTE
Assessment and Plan





Cultures:


9/27/2021 blood culture: no growth





A/P:


56-year-old male with diabetes, hypertension admitted with:





#Sepsis, secondary to right diabetic foot infection with associated fifth toe 

osteomyelitis, gangrenous change: Follows with outpatient podiatry, failed 

outpatient therapy.  Non-smoker. 





#Diabetes uncontrolled





#GERARD: improved





#Peripheral vascular disease: noted on arterial US. Status post RLE 

revascularization 9/30/2021





Recs:


-continue Ceftriaxone, Flagyl and vancomycin


-Do not see any cultures pending results unfortunately. 


-s/p debridement and TMA of right fifth toe


-OK to DC on Bactrim DS and Augmentin 875/125mg q12h for 7 days


-Follow up in clinic to go over pathology in 1-2 weeks. 





YOANA Rivas MD


[facility] ([facility])


O: [phone]


F: [phone]





Subjective


Date of service: 10/05/21


Principal diagnosis: PVD with gangrene


Interval history: 





Afebrile, normal white count.  No acute change.





Objective





- Exam


Narrative Exam: 








Constitutional: Alert, cooperative. No acute distress


Head, Ears, Nose: Normocephalic, atraumatic. External ears, nose normal


Eyes: Conjunctivae/corneas clear. No icterus. No ptosis.


Neck: Supple, no meningeal signs


Cardiovascular: S1, S2 +


Respiratory: Good air entry, clear to auscultation bilaterally


GI: Soft, non-tender; bowel sounds normal. No peritoneal signs


Musculoskeletal: Right foot in dressing


Skin: No rash or abscess


Hem/Lymphatic: No palpable cervical or supraclavicular nodes.


Psych: Mood ok. Affect normal


Neurological: Awake, alert, oriented. No gross abnormality    





- Constitutional


Vitals: 


                                   Vital Signs











Temp Pulse Resp BP Pulse Ox


 


 99.7 F H  93 H  22   123/78   93 


 


 10/05/21 08:33  10/05/21 08:33  10/05/21 08:33  10/05/21 08:33  10/05/21 08:33








                           Temperature -Last 24 Hours











Temperature                    99.7 F


 


Temperature                    98.8 F


 


Temperature                    98.3 F


 


Temperature                    98.1 F

















- Labs


CBC & Chem 7: 


                                 10/05/21 04:37





                                 10/05/21 04:37


Labs: 


                              Abnormal lab results











  10/04/21 10/04/21 10/04/21 Range/Units





  11:43 17:01 20:43 


 


RBC     (3.65-5.03)  M/mm3


 


Hgb     (11.8-15.2)  gm/dl


 


Hct     (35.5-45.6)  %


 


MCV     (84-94)  fl


 


MCH     (28-32)  pg


 


Plt Count     (140-440)  K/mm3


 


Mono % (Auto)     (0.0-7.3)  %


 


Mono # (Auto)     (0.0-0.8)  K/mm3


 


Glucose     ()  mg/dL


 


POC Glucose  288 H  276 H  340 H  ()  mg/dL














  10/05/21 10/05/21 10/05/21 Range/Units





  04:37 04:37 08:37 


 


RBC  3.59 L    (3.65-5.03)  M/mm3


 


Hgb  9.1 L    (11.8-15.2)  gm/dl


 


Hct  28.5 L    (35.5-45.6)  %


 


MCV  79 L    (84-94)  fl


 


MCH  25 L    (28-32)  pg


 


Plt Count  474 H    (140-440)  K/mm3


 


Mono % (Auto)  11.1 H    (0.0-7.3)  %


 


Mono # (Auto)  1.0 H    (0.0-0.8)  K/mm3


 


Glucose   156 H   ()  mg/dL


 


POC Glucose    160 H  ()  mg/dL

## 2021-10-05 NOTE — DISCHARGE SUMMARY
Providers





- Providers


Date of Admission: 


09/28/21 00:25





Date of discharge: 10/05/21


Attending physician: 


MARLENY STATON





                                        





09/28/21 00:27


Consult to Physician [CONS] Routine 


   Comment: 


   Consulting Provider: LIVIA SMITH


   Physician Instructions: 


   Reason For Exam: Rt foot cellulitis, gas gangrene,? Osteomyelitia


Consult to Physician [CONS] Routine 


   Comment: 


   Consulting Provider: TIN RICARDO


   Physician Instructions: 


   Reason For Exam: right root cellulitis,?Gas gangrene,?osteomyelitis





09/28/21 02:10


Consult to Wound/ET Nurse [CONS] Routine 


   Reason For Exam: wound eval, right foot wound





09/29/21 04:17


Consult to Wound/ET Nurse [CONS] Routine 


   Reason For Exam: wound eval





09/29/21 09:46


Consult to Physician [CONS] Routine 


   Comment: 


   Consulting Provider: MAUREEN SPICER


   Physician Instructions: 


   Reason For Exam: gas noted in foot wound with osteo





09/29/21 15:25


Consult to Physician [CONS] Routine 


   Comment: 


   Consulting Provider: FARIDEH LORENZO


   Physician Instructions: 


   Reason For Exam: abnormal arterial doppler studies, PVD





09/30/21 09:41


Consult to Physician [CONS] Routine 


   Comment: 


   Consulting Provider: ROSLYN LYONS


   Physician Instructions: already spoke with dr. lyons


   Reason For Exam: please eval for amputation





10/02/21 10:20


Physical Therapy Evaluation and Treat [CONS] Routine 


   Comment: 


   Reason For Exam: Recent TMA











Primary care physician: 


PRIMARY CARE MD








Hospitalization


Condition: Stable


Disposition: 01 HOME / SELF CARE / HOMELESS


Final Discharge Diagnosis (Prints w/discharge instructions): 1.Osteomyelitis 5th

 metatarsal and proximal phalanx 5th toe





- Discharge Diagnoses


(1) Foot osteomyelitis, right


Status: Acute   


Qualifiers: 


   Osteomyelitis type: unspecified type   Qualified Code(s): M86.9 - 

Osteomyelitis, unspecified   





Core Measure Documentation





- Palliative Care


Palliative Care/ Comfort Measures: Not Applicable





- Core Measures


Any of the following diagnoses?: none





Exam





- Constitutional


Vitals: 


                                        











Temp Pulse Resp BP Pulse Ox


 


 98.6 F   93 H  20   134/85   97 


 


 10/05/21 11:16  10/05/21 11:16  10/05/21 11:16  10/05/21 11:16  10/05/21 11:16














Plan


Activity: advance as tolerated


Diet: low fat, low cholesterol, low salt


Special Instructions: other (Follow up Dr. Lyons, Dr. Rivas and Dr. Alexis.)


Plan of Treatment: 


1.Follow up with PCP in 1 week.


2.Follow up with Dr. Rivas, ID in 1 week


3.Follow up with Dr. Lyons, Surgeon in 1-2 days to follow wound


4.Follow up with Dr. Alexis, Vasc Surg in 2 weeks


Follow up with: 


PRIMARY CARE,MD [Primary Care Provider] - 3-5 Days


Prescriptions: 


Aspirin [Aspirin BABY CHEW TAB] 81 mg PO QDAY #30 tab.chew


Amoxicillin/K Clav Tab [Augmentin 875MG TAB] 1 each PO Q12HR 7 Days #14 tablet


Sulfamethoxazole/Trimethoprim [Bactrim DS TAB] 1 each PO Q12HR 7 Days #14 tablet


Famotidine [Pepcid] 20 mg PO BID #60 tablet


Clopidogrel [Plavix] 75 mg PO QDAY #30 tablet

## 2021-12-27 ENCOUNTER — HOSPITAL ENCOUNTER (OUTPATIENT)
Dept: HOSPITAL 5 - OR | Age: 56
Discharge: HOME | End: 2021-12-27
Attending: SURGERY
Payer: COMMERCIAL

## 2021-12-27 VITALS — SYSTOLIC BLOOD PRESSURE: 130 MMHG | DIASTOLIC BLOOD PRESSURE: 78 MMHG

## 2021-12-27 DIAGNOSIS — Z98.890: ICD-10-CM

## 2021-12-27 DIAGNOSIS — Z79.899: ICD-10-CM

## 2021-12-27 DIAGNOSIS — M86.8X7: Primary | ICD-10-CM

## 2021-12-27 DIAGNOSIS — I10: ICD-10-CM

## 2021-12-27 DIAGNOSIS — E11.9: ICD-10-CM

## 2021-12-27 LAB
BUN SERPL-MCNC: 26 MG/DL (ref 9–20)
BUN/CREAT SERPL: 20 %
CALCIUM SERPL-MCNC: 9.8 MG/DL (ref 8.4–10.2)
HEMOLYSIS INDEX: 2

## 2021-12-27 PROCEDURE — 88302 TISSUE EXAM BY PATHOLOGIST: CPT

## 2021-12-27 PROCEDURE — 36415 COLL VENOUS BLD VENIPUNCTURE: CPT

## 2021-12-27 PROCEDURE — 88305 TISSUE EXAM BY PATHOLOGIST: CPT

## 2021-12-27 PROCEDURE — 28810 AMPUTATION TOE & METATARSAL: CPT

## 2021-12-27 PROCEDURE — 82962 GLUCOSE BLOOD TEST: CPT

## 2021-12-27 PROCEDURE — 88311 DECALCIFY TISSUE: CPT

## 2021-12-27 PROCEDURE — 80048 BASIC METABOLIC PNL TOTAL CA: CPT

## 2021-12-27 NOTE — PROCEDURE NOTE
Date of procedure: 12/27/21


Pre-op diagnosis: Osteomyelitis, right 4th toe


Post-op diagnosis: same


Procedure: 





Right 4th toe TMA





Description of procedure:  Pt was placed supine on the OR table.  MAC anesthesia

was administered.  Right foot and leg were prepped and draped.  A tear drop 

incision was made about the right 4th toe and the toe amputated at the MTP 

joint.  Hemostasis was obtained with the Bovie.  Periosteum was elevated off of 

the distal 4th metatarsal shaft and the shaft amputated with a bone saw.  Wound 

was irrigated with warm saline.  Surgicel was applied to the wound bed.  This 

was followed by dry 4 X 4's and Kerlix and Coban wraps.  Pt tolerated the 

procedure well and was taken to PACU in stable condition.  


Anesthesia: MAC


Surgeon: ROSLYN VILLANUEVA


Estimated blood loss: 50-100ml


Pathology: list (Right 4th toe and metatarsal head)


Specimen disposition: to lab


Condition: stable


Disposition: PACU

## 2022-02-02 ENCOUNTER — HOSPITAL ENCOUNTER (OUTPATIENT)
Dept: HOSPITAL 5 - WOUND | Age: 57
Discharge: HOME | End: 2022-02-02
Attending: SURGERY
Payer: COMMERCIAL

## 2022-02-02 DIAGNOSIS — M86.671: ICD-10-CM

## 2022-02-02 DIAGNOSIS — Z79.82: ICD-10-CM

## 2022-02-02 DIAGNOSIS — E11.69: ICD-10-CM
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
## 2022-02-02 DIAGNOSIS — Z79.84: ICD-10-CM

## 2022-02-02 DIAGNOSIS — T87.89: Primary | ICD-10-CM

## 2022-02-02 DIAGNOSIS — L97.512: ICD-10-CM

## 2022-02-02 DIAGNOSIS — I10: ICD-10-CM

## 2022-02-02 DIAGNOSIS — E11.621: ICD-10-CM

## 2022-02-09 ENCOUNTER — HOSPITAL ENCOUNTER (OUTPATIENT)
Dept: HOSPITAL 5 - WOUND | Age: 57
Discharge: HOME | End: 2022-02-09
Attending: SURGERY
Payer: COMMERCIAL

## 2022-02-09 DIAGNOSIS — E11.69: ICD-10-CM

## 2022-02-09 DIAGNOSIS — Z79.82: ICD-10-CM

## 2022-02-09 DIAGNOSIS — Z79.84: ICD-10-CM

## 2022-02-09 DIAGNOSIS — T87.89: Primary | ICD-10-CM

## 2022-02-09 DIAGNOSIS — M86.671: ICD-10-CM

## 2022-02-09 DIAGNOSIS — E11.621: ICD-10-CM
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
## 2022-02-09 DIAGNOSIS — I10: ICD-10-CM

## 2022-02-09 DIAGNOSIS — L97.512: ICD-10-CM

## 2022-03-15 ENCOUNTER — HOSPITAL ENCOUNTER (OUTPATIENT)
Dept: HOSPITAL 5 - GIO | Age: 57
Discharge: HOME | End: 2022-03-15
Attending: SURGERY
Payer: COMMERCIAL

## 2022-03-15 VITALS — SYSTOLIC BLOOD PRESSURE: 110 MMHG | DIASTOLIC BLOOD PRESSURE: 83 MMHG

## 2022-03-15 DIAGNOSIS — Z79.899: ICD-10-CM

## 2022-03-15 DIAGNOSIS — I10: ICD-10-CM

## 2022-03-15 DIAGNOSIS — D64.9: ICD-10-CM

## 2022-03-15 DIAGNOSIS — Z79.82: ICD-10-CM

## 2022-03-15 DIAGNOSIS — Z79.84: ICD-10-CM

## 2022-03-15 DIAGNOSIS — E11.51: ICD-10-CM

## 2022-03-15 DIAGNOSIS — E11.621: ICD-10-CM

## 2022-03-15 DIAGNOSIS — K62.1: ICD-10-CM

## 2022-03-15 DIAGNOSIS — D50.9: Primary | ICD-10-CM

## 2022-03-15 DIAGNOSIS — E78.00: ICD-10-CM

## 2022-03-15 PROCEDURE — 82962 GLUCOSE BLOOD TEST: CPT

## 2022-03-15 PROCEDURE — 88305 TISSUE EXAM BY PATHOLOGIST: CPT

## 2022-03-15 PROCEDURE — 45385 COLONOSCOPY W/LESION REMOVAL: CPT

## 2022-03-15 NOTE — ANESTHESIA CONSULTATION
Anesthesia Consult and Med Hx


Date of service: 03/15/22





- Airway


Anesthetic Teeth Evaluation: Good


ROM Head & Neck: Adequate


Mental/Hyoid Distance: Adequate


Mallampati Class: Class I


Intubation Access Assessment: Good





- Pulmonary Exam


CTA: Yes





- Cardiac Exam


Cardiac Exam: RRR





- Pre-Operative Health Status


ASA Pre-Surgery Classification: ASA2


Proposed Anesthetic Plan: MAC





- Pulmonary


Hx Smoking: No


Hx Respiratory Symptoms: No


COPD: No


Hx Sleep Apnea: No





- Cardiovascular System


Hx Hypertension: Yes (+ high cholesterol)


Hx Heart Attack/AMI: No





- Central Nervous System


Hx Neuromuscular Disorder: No


CVA: No





- Gastrointestinal


Hx Gastroesophageal Reflux Disease: No





- Endocrine


Hx Renal Disease: No


Hx End Stage Renal Disease: No


Hx Liver Disease: No


Hx Insulin Dependent Diabetes: No


Hx Non-Insulin Dependent Diabetes: Yes


Hx Thyroid Disease: No





- Hematic


Hx Anemia: Yes


Hx Sickle Cell Disease: No





- Other Systems


Hx Alcohol Use: No


Hx Substance Use: No


Hx Cancer: No


Hx Obesity: No





- Additional Comments


Anesthesia Medical History Comments: no hx of anesthetic complications

## 2022-03-15 NOTE — PROCEDURE NOTE
Date of procedure: 03/15/22


Pre-op diagnosis: Iron deficiency anemia


Post-op diagnosis: same (Also, benign appearing 5 mm rectal polyp)


Procedure: 





Colonoscopy with snare polypectomy





Description of procedure:  Pt was placed left side down.  MAC anesthesia was 

administered.  Scope was introduced into the pt's rectum.  Prep was poor.  Scope

was advanced to the cecum without difficulty.  The only abnormality noted was a 

benign appearing, 7 mm sessile polyp of the rectum at 5 cm.  This was removed 

using the snare and cautery.  The polyp was retrieved with the pinch biopsy 

forceps.  Insufflated air was aspirated.  Scope was withdrawn.  Pt tolerated the

procedure well.


Findings: 





See above.


Anesthesia: MAC


Surgeon: ROSLYN VILLANUEVA


Estimated blood loss: minimal


Pathology: list (rectal polyp)


Specimen disposition: to lab


Condition: stable


Disposition: PACU

## 2024-08-06 NOTE — ANESTHESIA CONSULTATION
Anesthesia Consult and Med Hx


Date of service: 12/27/21





- Airway


Anesthetic Teeth Evaluation: Good


ROM Head & Neck: Adequate


Mental/Hyoid Distance: Adequate


Mallampati Class: Class II


Intubation Access Assessment: Probably Good (previous LMA 4)





- Pre-Operative Health Status


ASA Pre-Surgery Classification: ASA3


Proposed Anesthetic Plan: MAC





- Pulmonary


Hx Respiratory Symptoms: No





- Cardiovascular System


Hx Hypertension: Yes


Hx Heart Attack/AMI: No





- Central Nervous System


CVA: No





- Endocrine


Hx Renal Disease: No


Hx Liver Disease: No


Hx Non-Insulin Dependent Diabetes: Yes


Hx Thyroid Disease: No





- Hematic


Hx Anemia: Yes





- Other Systems


Hx Obesity: No





- Additional Comments


Anesthesia Medical History Comments: No hx anesthetic complications. 

Hyperglycemic in preop. Will treat with insulin and monitor perioperatively. low back pain